# Patient Record
Sex: FEMALE | Race: WHITE | NOT HISPANIC OR LATINO | URBAN - METROPOLITAN AREA
[De-identification: names, ages, dates, MRNs, and addresses within clinical notes are randomized per-mention and may not be internally consistent; named-entity substitution may affect disease eponyms.]

---

## 2017-07-14 ENCOUNTER — EMERGENCY (EMERGENCY)
Facility: HOSPITAL | Age: 82
LOS: 1 days | End: 2017-07-14
Payer: MEDICARE

## 2017-07-14 PROCEDURE — 99284 EMERGENCY DEPT VISIT MOD MDM: CPT

## 2017-07-14 PROCEDURE — 70450 CT HEAD/BRAIN W/O DYE: CPT | Mod: 26

## 2018-05-14 ENCOUNTER — APPOINTMENT (OUTPATIENT)
Dept: OBGYN | Facility: CLINIC | Age: 83
End: 2018-05-14
Payer: MEDICARE

## 2018-05-14 VITALS
DIASTOLIC BLOOD PRESSURE: 80 MMHG | HEIGHT: 60 IN | BODY MASS INDEX: 29.84 KG/M2 | SYSTOLIC BLOOD PRESSURE: 130 MMHG | WEIGHT: 152 LBS

## 2018-05-14 PROCEDURE — 57150 TREAT VAGINA INFECTION: CPT

## 2018-08-16 ENCOUNTER — APPOINTMENT (OUTPATIENT)
Dept: OBGYN | Facility: CLINIC | Age: 83
End: 2018-08-16
Payer: MEDICARE

## 2018-08-16 VITALS — WEIGHT: 152 LBS | HEIGHT: 60 IN | BODY MASS INDEX: 29.84 KG/M2

## 2018-08-16 DIAGNOSIS — Z86.79 PERSONAL HISTORY OF OTHER DISEASES OF THE CIRCULATORY SYSTEM: ICD-10-CM

## 2018-08-16 DIAGNOSIS — Z86.39 PERSONAL HISTORY OF OTHER ENDOCRINE, NUTRITIONAL AND METABOLIC DISEASE: ICD-10-CM

## 2018-08-16 PROCEDURE — 57150 TREAT VAGINA INFECTION: CPT

## 2018-08-16 RX ORDER — ENALAPRIL MALEATE 20 MG/1
20 TABLET ORAL
Refills: 0 | Status: ACTIVE | COMMUNITY

## 2018-11-16 ENCOUNTER — APPOINTMENT (OUTPATIENT)
Dept: OBGYN | Facility: CLINIC | Age: 83
End: 2018-11-16
Payer: MEDICARE

## 2018-11-16 PROCEDURE — 57150 TREAT VAGINA INFECTION: CPT

## 2018-11-16 RX ORDER — HYDROCHLOROTHIAZIDE 25 MG/1
25 TABLET ORAL
Refills: 0 | Status: ACTIVE | COMMUNITY

## 2018-11-16 RX ORDER — OXYQUINOLINE/BORIC ACID 0.025 %
0.03 JELLY WITH APPLICATOR (GRAM) VAGINAL
Qty: 15 | Refills: 3 | Status: ACTIVE | COMMUNITY
Start: 2018-11-16 | End: 1900-01-01

## 2018-11-16 RX ORDER — LATANOPROST 50 UG/ML
0.01 SOLUTION OPHTHALMIC
Refills: 0 | Status: ACTIVE | COMMUNITY

## 2019-01-31 ENCOUNTER — EMERGENCY (EMERGENCY)
Facility: HOSPITAL | Age: 84
LOS: 1 days | End: 2019-01-31
Payer: MEDICARE

## 2019-01-31 PROCEDURE — 72170 X-RAY EXAM OF PELVIS: CPT | Mod: 26

## 2019-01-31 PROCEDURE — 72131 CT LUMBAR SPINE W/O DYE: CPT | Mod: 26

## 2019-01-31 PROCEDURE — 93970 EXTREMITY STUDY: CPT | Mod: 26

## 2019-01-31 PROCEDURE — 71045 X-RAY EXAM CHEST 1 VIEW: CPT | Mod: 26

## 2019-01-31 PROCEDURE — 72125 CT NECK SPINE W/O DYE: CPT | Mod: 26

## 2019-01-31 PROCEDURE — 70450 CT HEAD/BRAIN W/O DYE: CPT | Mod: 26

## 2019-01-31 PROCEDURE — 99285 EMERGENCY DEPT VISIT HI MDM: CPT

## 2019-02-11 ENCOUNTER — OUTPATIENT (OUTPATIENT)
Dept: OUTPATIENT SERVICES | Facility: HOSPITAL | Age: 84
LOS: 1 days | End: 2019-02-11
Payer: MEDICARE

## 2019-02-11 PROCEDURE — 72148 MRI LUMBAR SPINE W/O DYE: CPT | Mod: 26

## 2019-02-13 ENCOUNTER — APPOINTMENT (OUTPATIENT)
Dept: OBGYN | Facility: CLINIC | Age: 84
End: 2019-02-13
Payer: MEDICARE

## 2019-02-13 VITALS
WEIGHT: 152 LBS | DIASTOLIC BLOOD PRESSURE: 66 MMHG | HEIGHT: 60 IN | SYSTOLIC BLOOD PRESSURE: 150 MMHG | BODY MASS INDEX: 29.84 KG/M2

## 2019-02-13 PROCEDURE — 57150 TREAT VAGINA INFECTION: CPT

## 2019-02-13 NOTE — DISCUSSION/SUMMARY
[FreeTextEntry1] : a89-year-old white female came to the office for pessary cleaning patient denies having q.a.m. problems essentially were included and vaginal cavity cleaned and irrigated with Betadine return to the office

## 2019-02-14 ENCOUNTER — APPOINTMENT (OUTPATIENT)
Dept: OBGYN | Facility: HOSPITAL | Age: 84
End: 2019-02-14

## 2019-05-14 ENCOUNTER — APPOINTMENT (OUTPATIENT)
Dept: OBGYN | Facility: CLINIC | Age: 84
End: 2019-05-14

## 2019-05-16 ENCOUNTER — APPOINTMENT (OUTPATIENT)
Dept: OBGYN | Facility: CLINIC | Age: 84
End: 2019-05-16
Payer: MEDICARE

## 2019-05-16 PROCEDURE — 57150 TREAT VAGINA INFECTION: CPT

## 2019-05-16 NOTE — DISCUSSION/SUMMARY
[FreeTextEntry1] : a89 years old white female came to the office for pessary cleaning patient has cystorectocele and is being helped by the pessary but said he washed and cleaned vaginal area irrigated with Betadine reinserted the pessary return to the office in 3 months

## 2019-06-05 ENCOUNTER — EMERGENCY (EMERGENCY)
Facility: HOSPITAL | Age: 84
LOS: 1 days | End: 2019-06-05
Admitting: EMERGENCY MEDICINE
Payer: MEDICARE

## 2019-06-05 PROCEDURE — 70450 CT HEAD/BRAIN W/O DYE: CPT | Mod: 26

## 2019-06-05 PROCEDURE — 72125 CT NECK SPINE W/O DYE: CPT | Mod: 26

## 2019-06-05 PROCEDURE — 71046 X-RAY EXAM CHEST 2 VIEWS: CPT | Mod: 26

## 2019-06-05 PROCEDURE — 73030 X-RAY EXAM OF SHOULDER: CPT | Mod: 26,RT

## 2019-06-05 PROCEDURE — 99285 EMERGENCY DEPT VISIT HI MDM: CPT

## 2019-06-06 ENCOUNTER — INPATIENT (INPATIENT)
Facility: HOSPITAL | Age: 84
LOS: 1 days | Discharge: HOME | End: 2019-06-08
Payer: MEDICARE

## 2019-06-06 ENCOUNTER — OUTPATIENT (OUTPATIENT)
Dept: OUTPATIENT SERVICES | Facility: HOSPITAL | Age: 84
LOS: 1 days | End: 2019-06-06

## 2019-06-06 PROCEDURE — 99285 EMERGENCY DEPT VISIT HI MDM: CPT

## 2019-06-06 PROCEDURE — 93010 ELECTROCARDIOGRAM REPORT: CPT | Mod: 76

## 2019-06-06 PROCEDURE — 76770 US EXAM ABDO BACK WALL COMP: CPT | Mod: 26

## 2019-06-06 PROCEDURE — 93010 ELECTROCARDIOGRAM REPORT: CPT

## 2019-06-06 PROCEDURE — 76856 US EXAM PELVIC COMPLETE: CPT | Mod: 26

## 2019-06-07 ENCOUNTER — OUTPATIENT (OUTPATIENT)
Dept: OUTPATIENT SERVICES | Facility: HOSPITAL | Age: 84
LOS: 1 days | End: 2019-06-07

## 2019-06-08 ENCOUNTER — OUTPATIENT (OUTPATIENT)
Dept: OUTPATIENT SERVICES | Facility: HOSPITAL | Age: 84
LOS: 1 days | End: 2019-06-08

## 2019-06-12 ENCOUNTER — OUTPATIENT (OUTPATIENT)
Dept: OUTPATIENT SERVICES | Facility: HOSPITAL | Age: 84
LOS: 1 days | End: 2019-06-12

## 2019-06-12 ENCOUNTER — APPOINTMENT (OUTPATIENT)
Dept: OBGYN | Facility: CLINIC | Age: 84
End: 2019-06-12
Payer: MEDICARE

## 2019-06-12 PROCEDURE — 57150 TREAT VAGINA INFECTION: CPT

## 2019-06-12 NOTE — DISCUSSION/SUMMARY
[FreeTextEntry1] : 89 years old white female came to the office for pessary cleaning  she was admited to the hospital for uti  will culture  after treatment is done pessary cleaned and reinserted

## 2019-06-18 ENCOUNTER — OUTPATIENT (OUTPATIENT)
Dept: OUTPATIENT SERVICES | Facility: HOSPITAL | Age: 84
LOS: 1 days | End: 2019-06-18

## 2019-06-27 ENCOUNTER — OUTPATIENT (OUTPATIENT)
Dept: OUTPATIENT SERVICES | Facility: HOSPITAL | Age: 84
LOS: 1 days | End: 2019-06-27

## 2019-07-06 ENCOUNTER — EMERGENCY (EMERGENCY)
Facility: HOSPITAL | Age: 84
LOS: 1 days | End: 2019-07-06
Admitting: EMERGENCY MEDICINE
Payer: MEDICARE

## 2019-07-06 PROCEDURE — 71045 X-RAY EXAM CHEST 1 VIEW: CPT | Mod: 26

## 2019-07-06 PROCEDURE — 99284 EMERGENCY DEPT VISIT MOD MDM: CPT

## 2019-07-10 ENCOUNTER — OUTPATIENT (OUTPATIENT)
Dept: OUTPATIENT SERVICES | Facility: HOSPITAL | Age: 84
LOS: 1 days | End: 2019-07-10

## 2019-07-10 ENCOUNTER — INPATIENT (INPATIENT)
Facility: HOSPITAL | Age: 84
LOS: 8 days | Discharge: HOME | End: 2019-07-19
Payer: MEDICARE

## 2019-07-10 PROCEDURE — 99285 EMERGENCY DEPT VISIT HI MDM: CPT

## 2019-07-10 PROCEDURE — 71045 X-RAY EXAM CHEST 1 VIEW: CPT | Mod: 26

## 2019-07-11 ENCOUNTER — OUTPATIENT (OUTPATIENT)
Dept: OUTPATIENT SERVICES | Facility: HOSPITAL | Age: 84
LOS: 1 days | End: 2019-07-11

## 2019-07-11 PROCEDURE — 76770 US EXAM ABDO BACK WALL COMP: CPT | Mod: 26

## 2019-07-11 PROCEDURE — 93010 ELECTROCARDIOGRAM REPORT: CPT

## 2019-07-11 PROCEDURE — 76856 US EXAM PELVIC COMPLETE: CPT | Mod: 26

## 2019-07-12 ENCOUNTER — OUTPATIENT (OUTPATIENT)
Dept: OUTPATIENT SERVICES | Facility: HOSPITAL | Age: 84
LOS: 1 days | End: 2019-07-12

## 2019-07-12 ENCOUNTER — OTHER (OUTPATIENT)
Age: 84
End: 2019-07-12

## 2019-07-13 ENCOUNTER — OUTPATIENT (OUTPATIENT)
Dept: OUTPATIENT SERVICES | Facility: HOSPITAL | Age: 84
LOS: 1 days | End: 2019-07-13

## 2019-07-14 ENCOUNTER — OUTPATIENT (OUTPATIENT)
Dept: OUTPATIENT SERVICES | Facility: HOSPITAL | Age: 84
LOS: 1 days | End: 2019-07-14

## 2019-07-15 ENCOUNTER — OUTPATIENT (OUTPATIENT)
Dept: OUTPATIENT SERVICES | Facility: HOSPITAL | Age: 84
LOS: 1 days | End: 2019-07-15

## 2019-07-15 PROCEDURE — 93971 EXTREMITY STUDY: CPT | Mod: 26,RT

## 2019-07-16 ENCOUNTER — OUTPATIENT (OUTPATIENT)
Dept: OUTPATIENT SERVICES | Facility: HOSPITAL | Age: 84
LOS: 1 days | End: 2019-07-16

## 2019-07-17 ENCOUNTER — OUTPATIENT (OUTPATIENT)
Dept: OUTPATIENT SERVICES | Facility: HOSPITAL | Age: 84
LOS: 1 days | End: 2019-07-17

## 2019-07-18 ENCOUNTER — OUTPATIENT (OUTPATIENT)
Dept: OUTPATIENT SERVICES | Facility: HOSPITAL | Age: 84
LOS: 1 days | End: 2019-07-18

## 2019-07-19 ENCOUNTER — OUTPATIENT (OUTPATIENT)
Dept: OUTPATIENT SERVICES | Facility: HOSPITAL | Age: 84
LOS: 1 days | End: 2019-07-19

## 2019-08-01 ENCOUNTER — OUTPATIENT (OUTPATIENT)
Dept: OUTPATIENT SERVICES | Facility: HOSPITAL | Age: 84
LOS: 1 days | End: 2019-08-01

## 2019-08-05 ENCOUNTER — OUTPATIENT (OUTPATIENT)
Dept: OUTPATIENT SERVICES | Facility: HOSPITAL | Age: 84
LOS: 1 days | End: 2019-08-05

## 2019-08-09 ENCOUNTER — INPATIENT (INPATIENT)
Facility: HOSPITAL | Age: 84
LOS: 2 days | Discharge: HOME | End: 2019-08-12
Payer: MEDICARE

## 2019-08-09 ENCOUNTER — OUTPATIENT (OUTPATIENT)
Dept: OUTPATIENT SERVICES | Facility: HOSPITAL | Age: 84
LOS: 1 days | End: 2019-08-09

## 2019-08-09 PROCEDURE — 99285 EMERGENCY DEPT VISIT HI MDM: CPT

## 2019-08-10 ENCOUNTER — OUTPATIENT (OUTPATIENT)
Dept: OUTPATIENT SERVICES | Facility: HOSPITAL | Age: 84
LOS: 1 days | End: 2019-08-10

## 2019-08-11 ENCOUNTER — OUTPATIENT (OUTPATIENT)
Dept: OUTPATIENT SERVICES | Facility: HOSPITAL | Age: 84
LOS: 1 days | End: 2019-08-11

## 2019-08-15 ENCOUNTER — OUTPATIENT (OUTPATIENT)
Dept: OUTPATIENT SERVICES | Facility: HOSPITAL | Age: 84
LOS: 1 days | End: 2019-08-15

## 2019-08-21 ENCOUNTER — APPOINTMENT (OUTPATIENT)
Dept: OBGYN | Facility: CLINIC | Age: 84
End: 2019-08-21

## 2019-08-21 ENCOUNTER — EMERGENCY (EMERGENCY)
Facility: HOSPITAL | Age: 84
LOS: 1 days | End: 2019-08-21
Admitting: EMERGENCY MEDICINE
Payer: MEDICARE

## 2019-08-21 PROCEDURE — 99284 EMERGENCY DEPT VISIT MOD MDM: CPT

## 2019-08-22 ENCOUNTER — EMERGENCY (EMERGENCY)
Facility: HOSPITAL | Age: 84
LOS: 1 days | End: 2019-08-22
Admitting: EMERGENCY MEDICINE
Payer: MEDICARE

## 2019-08-22 PROCEDURE — 99283 EMERGENCY DEPT VISIT LOW MDM: CPT

## 2019-08-27 ENCOUNTER — APPOINTMENT (OUTPATIENT)
Dept: OBGYN | Facility: CLINIC | Age: 84
End: 2019-08-27
Payer: MEDICARE

## 2019-08-27 VITALS
HEIGHT: 60 IN | BODY MASS INDEX: 29.84 KG/M2 | SYSTOLIC BLOOD PRESSURE: 140 MMHG | DIASTOLIC BLOOD PRESSURE: 70 MMHG | WEIGHT: 152 LBS

## 2019-08-27 PROCEDURE — 57150 TREAT VAGINA INFECTION: CPT

## 2019-08-27 NOTE — DISCUSSION/SUMMARY
[FreeTextEntry1] : a89 years old white female continue office for pessary cleaning pessary cleaned irrigated the vaginal area with Betadine transected the pessary return to the office in 3 months

## 2019-09-05 ENCOUNTER — OUTPATIENT (OUTPATIENT)
Dept: OUTPATIENT SERVICES | Facility: HOSPITAL | Age: 84
LOS: 1 days | End: 2019-09-05

## 2019-09-05 ENCOUNTER — INPATIENT (INPATIENT)
Facility: HOSPITAL | Age: 84
LOS: 0 days | Discharge: HOME | End: 2019-09-06
Payer: MEDICARE

## 2019-09-05 PROCEDURE — 99285 EMERGENCY DEPT VISIT HI MDM: CPT

## 2019-09-06 ENCOUNTER — OUTPATIENT (OUTPATIENT)
Dept: OUTPATIENT SERVICES | Facility: HOSPITAL | Age: 84
LOS: 1 days | End: 2019-09-06

## 2019-09-06 PROCEDURE — 76770 US EXAM ABDO BACK WALL COMP: CPT | Mod: 26

## 2019-09-06 PROCEDURE — 93010 ELECTROCARDIOGRAM REPORT: CPT

## 2019-09-06 PROCEDURE — 76856 US EXAM PELVIC COMPLETE: CPT | Mod: 26

## 2019-09-06 PROCEDURE — 93880 EXTRACRANIAL BILAT STUDY: CPT | Mod: 26

## 2019-09-18 ENCOUNTER — APPOINTMENT (OUTPATIENT)
Age: 84
End: 2019-09-18

## 2019-09-27 ENCOUNTER — EMERGENCY (EMERGENCY)
Facility: HOSPITAL | Age: 84
LOS: 1 days | End: 2019-09-27
Admitting: EMERGENCY MEDICINE
Payer: MEDICARE

## 2019-09-27 PROCEDURE — 99284 EMERGENCY DEPT VISIT MOD MDM: CPT

## 2019-10-02 ENCOUNTER — APPOINTMENT (OUTPATIENT)
Dept: ULTRASOUND IMAGING | Facility: CLINIC | Age: 84
End: 2019-10-02
Payer: MEDICARE

## 2019-10-02 PROCEDURE — 93971 EXTREMITY STUDY: CPT | Mod: RT

## 2019-11-06 ENCOUNTER — APPOINTMENT (OUTPATIENT)
Dept: OBGYN | Facility: CLINIC | Age: 84
End: 2019-11-06

## 2019-11-11 ENCOUNTER — APPOINTMENT (OUTPATIENT)
Dept: OBGYN | Facility: CLINIC | Age: 84
End: 2019-11-11
Payer: MEDICARE

## 2019-11-11 VITALS
DIASTOLIC BLOOD PRESSURE: 62 MMHG | BODY MASS INDEX: 26.31 KG/M2 | WEIGHT: 134 LBS | SYSTOLIC BLOOD PRESSURE: 100 MMHG | HEIGHT: 60 IN

## 2019-11-11 PROCEDURE — 57150 TREAT VAGINA INFECTION: CPT

## 2019-11-20 PROBLEM — Z00.00 ENCOUNTER FOR PREVENTIVE HEALTH EXAMINATION: Status: ACTIVE | Noted: 2019-11-20

## 2020-02-14 ENCOUNTER — APPOINTMENT (OUTPATIENT)
Dept: OBGYN | Facility: CLINIC | Age: 85
End: 2020-02-14
Payer: MEDICARE

## 2020-02-14 VITALS
BODY MASS INDEX: 25.52 KG/M2 | SYSTOLIC BLOOD PRESSURE: 124 MMHG | HEIGHT: 60 IN | WEIGHT: 130 LBS | DIASTOLIC BLOOD PRESSURE: 70 MMHG

## 2020-02-14 DIAGNOSIS — Z00.00 ENCOUNTER FOR GENERAL ADULT MEDICAL EXAMINATION W/OUT ABNORMAL FINDINGS: ICD-10-CM

## 2020-02-14 PROCEDURE — 99213 OFFICE O/P EST LOW 20 MIN: CPT

## 2020-03-05 ENCOUNTER — OUTPATIENT (OUTPATIENT)
Dept: OUTPATIENT SERVICES | Facility: HOSPITAL | Age: 85
LOS: 1 days | End: 2020-03-05

## 2020-03-07 ENCOUNTER — EMERGENCY (EMERGENCY)
Facility: HOSPITAL | Age: 85
LOS: 1 days | End: 2020-03-07
Admitting: EMERGENCY MEDICINE
Payer: MEDICARE

## 2020-03-07 PROCEDURE — 99283 EMERGENCY DEPT VISIT LOW MDM: CPT

## 2020-03-07 PROCEDURE — 72110 X-RAY EXAM L-2 SPINE 4/>VWS: CPT | Mod: 26

## 2020-03-07 PROCEDURE — 73080 X-RAY EXAM OF ELBOW: CPT | Mod: 26,RT

## 2020-03-07 PROCEDURE — 72220 X-RAY EXAM SACRUM TAILBONE: CPT | Mod: 26

## 2020-03-09 ENCOUNTER — OUTPATIENT (OUTPATIENT)
Dept: OUTPATIENT SERVICES | Facility: HOSPITAL | Age: 85
LOS: 1 days | End: 2020-03-09
Payer: MEDICARE

## 2020-03-09 PROCEDURE — 74270 X-RAY XM COLON 1CNTRST STD: CPT | Mod: 26

## 2020-03-17 ENCOUNTER — OUTPATIENT (OUTPATIENT)
Dept: OUTPATIENT SERVICES | Facility: HOSPITAL | Age: 85
LOS: 1 days | End: 2020-03-17

## 2020-04-09 ENCOUNTER — OUTPATIENT (OUTPATIENT)
Dept: OUTPATIENT SERVICES | Facility: HOSPITAL | Age: 85
LOS: 1 days | End: 2020-04-09

## 2020-05-15 ENCOUNTER — APPOINTMENT (OUTPATIENT)
Dept: OBGYN | Facility: CLINIC | Age: 85
End: 2020-05-15
Payer: MEDICARE

## 2020-05-15 VITALS
WEIGHT: 130 LBS | BODY MASS INDEX: 25.52 KG/M2 | DIASTOLIC BLOOD PRESSURE: 66 MMHG | TEMPERATURE: 98.3 F | HEIGHT: 60 IN | SYSTOLIC BLOOD PRESSURE: 136 MMHG

## 2020-05-15 PROCEDURE — 99213 OFFICE O/P EST LOW 20 MIN: CPT

## 2020-06-10 ENCOUNTER — APPOINTMENT (OUTPATIENT)
Dept: RADIOLOGY | Facility: CLINIC | Age: 85
End: 2020-06-10
Payer: MEDICARE

## 2020-06-10 ENCOUNTER — APPOINTMENT (OUTPATIENT)
Dept: ULTRASOUND IMAGING | Facility: CLINIC | Age: 85
End: 2020-06-10

## 2020-06-10 PROCEDURE — 93971 EXTREMITY STUDY: CPT | Mod: LT

## 2020-06-10 PROCEDURE — 73630 X-RAY EXAM OF FOOT: CPT | Mod: LT

## 2020-07-20 ENCOUNTER — APPOINTMENT (OUTPATIENT)
Dept: OBGYN | Facility: CLINIC | Age: 85
End: 2020-07-20
Payer: MEDICARE

## 2020-07-20 VITALS
SYSTOLIC BLOOD PRESSURE: 140 MMHG | HEIGHT: 60 IN | WEIGHT: 121 LBS | DIASTOLIC BLOOD PRESSURE: 82 MMHG | BODY MASS INDEX: 23.75 KG/M2

## 2020-07-20 PROCEDURE — 99214 OFFICE O/P EST MOD 30 MIN: CPT

## 2020-07-31 ENCOUNTER — APPOINTMENT (OUTPATIENT)
Age: 85
End: 2020-07-31
Payer: MEDICARE

## 2020-07-31 ENCOUNTER — APPOINTMENT (OUTPATIENT)
Dept: UROGYNECOLOGY | Facility: CLINIC | Age: 85
End: 2020-07-31

## 2020-07-31 VITALS — SYSTOLIC BLOOD PRESSURE: 162 MMHG | DIASTOLIC BLOOD PRESSURE: 70 MMHG

## 2020-07-31 DIAGNOSIS — Z87.828 PERSONAL HISTORY OF OTHER (HEALED) PHYSICAL INJURY AND TRAUMA: ICD-10-CM

## 2020-07-31 DIAGNOSIS — N81.9 FEMALE GENITAL PROLAPSE, UNSPECIFIED: ICD-10-CM

## 2020-07-31 DIAGNOSIS — Z80.1 FAMILY HISTORY OF MALIGNANT NEOPLASM OF TRACHEA, BRONCHUS AND LUNG: ICD-10-CM

## 2020-07-31 DIAGNOSIS — Z86.79 PERSONAL HISTORY OF OTHER DISEASES OF THE CIRCULATORY SYSTEM: ICD-10-CM

## 2020-07-31 DIAGNOSIS — Z86.69 PERSONAL HISTORY OF OTHER DISEASES OF THE NERVOUS SYSTEM AND SENSE ORGANS: ICD-10-CM

## 2020-07-31 DIAGNOSIS — Z87.440 PERSONAL HISTORY OF URINARY (TRACT) INFECTIONS: ICD-10-CM

## 2020-07-31 DIAGNOSIS — Z82.49 FAMILY HISTORY OF ISCHEMIC HEART DISEASE AND OTHER DISEASES OF THE CIRCULATORY SYSTEM: ICD-10-CM

## 2020-07-31 DIAGNOSIS — R32 UNSPECIFIED URINARY INCONTINENCE: ICD-10-CM

## 2020-07-31 DIAGNOSIS — Z87.448 PERSONAL HISTORY OF OTHER DISEASES OF URINARY SYSTEM: ICD-10-CM

## 2020-07-31 DIAGNOSIS — Z63.4 DISAPPEARANCE AND DEATH OF FAMILY MEMBER: ICD-10-CM

## 2020-07-31 DIAGNOSIS — K59.00 CONSTIPATION, UNSPECIFIED: ICD-10-CM

## 2020-07-31 PROCEDURE — 51701 INSERT BLADDER CATHETER: CPT

## 2020-07-31 PROCEDURE — 99205 OFFICE O/P NEW HI 60 MIN: CPT | Mod: 25

## 2020-07-31 PROCEDURE — 99215 OFFICE O/P EST HI 40 MIN: CPT | Mod: 25

## 2020-07-31 SDOH — SOCIAL STABILITY - SOCIAL INSECURITY: DISSAPEARANCE AND DEATH OF FAMILY MEMBER: Z63.4

## 2020-07-31 NOTE — REASON FOR VISIT
[Initial Visit ___] : an initial visit for [unfilled] [Questionnaire Received] : Patient questionnaire received [Intake Form Reviewed] : Patient intake form with past medical history, surgical history, family history and social history reviewed today

## 2020-07-31 NOTE — LETTER BODY
[Dear  ___] : Dear  [unfilled], [Dear  ___] : Dear ~ASHLEY, [Attached please find my note.] : Attached please find my note. [DrAzael  ___] : Dr. LOERA  [Thank you very much for allowing me to participate in the care of this patient. If you have any questions, please do not hesitate to contact me] : Thank you very much for allowing me to participate in the care of this patient. If you have any questions, please do not hesitate to contact me.

## 2020-08-03 ENCOUNTER — RESULT CHARGE (OUTPATIENT)
Age: 85
End: 2020-08-03

## 2020-08-03 ENCOUNTER — APPOINTMENT (OUTPATIENT)
Age: 85
End: 2020-08-03
Payer: MEDICARE

## 2020-08-03 LAB
APPEARANCE: CLEAR
BACTERIA: ABNORMAL
BILIRUB UR QL STRIP: NEGATIVE
BILIRUBIN URINE: NEGATIVE
BLOOD URINE: NEGATIVE
CLARITY UR: CLEAR
COLLECTION METHOD: NORMAL
COLOR: NORMAL
GLUCOSE QUALITATIVE U: NEGATIVE
GLUCOSE UR-MCNC: NEGATIVE
HCG UR QL: 0.2 EU/DL
HGB UR QL STRIP.AUTO: NORMAL
HYALINE CASTS: 2 /LPF
KETONES UR-MCNC: NEGATIVE
KETONES URINE: NEGATIVE
LEUKOCYTE ESTERASE UR QL STRIP: NORMAL
LEUKOCYTE ESTERASE URINE: ABNORMAL
MICROSCOPIC-UA: NORMAL
NITRITE UR QL STRIP: NEGATIVE
NITRITE URINE: POSITIVE
PH UR STRIP: 5
PH URINE: 5.5
PROT UR STRIP-MCNC: NEGATIVE
PROTEIN URINE: NEGATIVE
RED BLOOD CELLS URINE: 0 /HPF
SP GR UR STRIP: 1.02
SPECIFIC GRAVITY URINE: 1.01
SQUAMOUS EPITHELIAL CELLS: 0 /HPF
UROBILINOGEN URINE: NORMAL
WHITE BLOOD CELLS URINE: 27 /HPF

## 2020-08-03 PROCEDURE — 51701 INSERT BLADDER CATHETER: CPT

## 2020-08-03 PROCEDURE — 99213 OFFICE O/P EST LOW 20 MIN: CPT | Mod: 25

## 2020-08-03 NOTE — HISTORY OF PRESENT ILLNESS
[Cystocele (Obstetric)] : no [Vaginal Wall Prolapse] : no [Rectal Prolapse] : no [Unable To Restrain Bowel Movement] : moderate [x1] : nocturia once nightly [Feelings Of Urinary Urgency] : no [Urinary Frequency] : no [Pain During Urination (Dysuria)] : no [Urinary Tract Infection] : moderate [Incomplete Emptying Of Stool] : no [Constipation Obstructed Defecation] : mild [] : mild [Pelvic Pain] : no [FreeTextEntry5] : none with pessary  [Vaginal Pain] : no [FreeTextEntry1] : \susana Triny presents for repeat cath as initial cath contaminated\par feels better with pessary out, reports mild rectal symptoms, only time uncomfortable is with sititng \par otherwise no change in symptoms, able to void with pessary

## 2020-08-03 NOTE — PHYSICAL EXAM
[Chaperone Present] : A chaperone was present in the examining room during all aspects of the physical examination [No Acute Distress] : in no acute distress [Well developed] : well developed [Well Nourished] : ~L well nourished [Oriented x3] : oriented to person, place, and time [Normal Memory] : ~T memory was ~L unimpaired [No Edema] : ~T edema was not present [None] : no CVA tenderness [Warm and Dry] : was warm and dry to touch [Normal Gait] : gait was normal [Vulvar Atrophy] : vulvar atrophy [Labia Majora] : were normal [Labia Minora] : were normal [Atrophy] : atrophy [No Bleeding] : there was no active vaginal bleeding [Ap ____] : Ap [unfilled] [Bp ____] : Bp [unfilled] [Soft] :  the cervix was soft [Uterine Adnexae] : were not tender and not enlarged [Normal] : no abnormalities [Post Void Residual ____ml] : post void residual was [unfilled] ml [Hernia] : hernia observed [Inguinal Hernia Right] : a right inguinal hernia was present [] : which was reducible [FreeTextEntry1] : General: Well, appearing. Alert and orientated. No acute distress\par HEENT: Normocephalic, atraumatic and extraocular muscles appear to be intact \par Neck: Full range of motion, no obvious lymphadenopathy, deformities, or masses noted \par Respiratory: Speaking in full sentences comfortably, normal work of breathing and no cough during visit\par Musculoskeletal: active full range of motion in extremities \par Extremities: No upper extremity edema noted\par Skin: no obvious rash or skin lesions\par Neuro: Orientated X 3, speech is fluent, normal rate\par Psych: Normal mood and affect \par \par  [Cough] : no cough [Distended] : not distended [Inguinal LAD] : no adenopathy was noted in the inguinal lymph nodes [H/Smegaly] : no hepatosplenomegaly [FreeTextEntry3] : caruncle  [de-identified] : unable to assess due to pessary [de-identified] : rectal mucosa prolapse noted

## 2020-08-03 NOTE — PROCEDURE
[Good Fit] : fits well [Pessary Out] : removed [None] : no bleeding [Medication Review] : Medicaiton Review: Patient verbalizes understanding of risks and benefits [Fluid Management] : Fluid Management: patient verbalizes understanding 6-10 cups per day [Bowel Management] : Bowel Management: patient verbalizes understanding of daily dietary fiber intake [Bladder Training] : Bladder Training: Patient given information with verbal understanding [Refit] : refit is not needed [Erosion] : no evidence of erosion [Erythema] : no erythema [Discharge] : no vaginal discharge [Infection] : no evidence of infection [FreeTextEntry1] : sterile straight catheterization performed to assess post void residual due to incontinence

## 2020-08-03 NOTE — HISTORY OF PRESENT ILLNESS
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

## 2020-08-03 NOTE — PHYSICAL EXAM
[Chaperone Present] : A chaperone was present in the examining room during all aspects of the physical examination [FreeTextEntry1] : General: Well, appearing. Alert and orientated. No acute distress\par HEENT: Normocephalic, atraumatic and extraocular muscles appear to be intact \par Neck: Full range of motion, no obvious lymphadenopathy, deformities, or masses noted \par Respiratory: Speaking in full sentences comfortably, normal work of breathing and no cough during visit\par Musculoskeletal: active full range of motion in extremities \par Extremities: No upper extremity edema noted\par Skin: no obvious rash or skin lesions\par Neuro: Orientated X 3, speech is fluent, normal rate\par Psych: Normal mood and affect \par \par  [No Acute Distress] : in no acute distress [Well developed] : well developed [Well Nourished] : ~L well nourished [Normal Memory] : ~T memory was ~L unimpaired [Oriented x3] : oriented to person, place, and time [Cough] : no cough [No Edema] : ~T edema was not present [Distended] : not distended [Hernia] : hernia observed [None] : no CVA tenderness [H/Smegaly] : no hepatosplenomegaly [Inguinal LAD] : no adenopathy was noted in the inguinal lymph nodes [] : which was reducible [Inguinal Hernia Right] : a right inguinal hernia was present [Warm and Dry] : was warm and dry to touch [Normal Gait] : gait was normal [Vulvar Atrophy] : vulvar atrophy [Labia Majora] : were normal [Atrophy] : atrophy [Labia Minora] : were normal [No Bleeding] : there was no active vaginal bleeding [Bp ____] : Bp [unfilled] [Ap ____] : Ap [unfilled] [Soft] :  the cervix was soft [Normal] : no abnormalities [Uterine Adnexae] : were not tender and not enlarged [Post Void Residual ____ml] : post void residual was [unfilled] ml [de-identified] : no apical descent [FreeTextEntry3] : caruncle

## 2020-08-03 NOTE — DISCUSSION/SUMMARY
[FreeTextEntry1] : \par Triny presents for f/u for cath u/a., will resend urine prior to treatment, get pelvic sonogram, will return to reassess prolapse, f/u with Dr. Silva, discussed if mild symptoms would advise not to have surgical intervention at this time. all questions were answered.\par

## 2020-08-03 NOTE — PROCEDURE
[Urinary Tract Infection] : a urinary tract infection [Patient] : the patient [Straight Catheterization] : insertion of a straight catheter [___ Fr Straight Tip] : a [unfilled] in [place] straight tip catheter [Clear] : clear [Tolerated Well] : the patient tolerated the procedure well [No Complications] : no complications [Culture] : culture [Post procedure instructions and information given] : Post procedure instructions and information were given and reviewed with patient.

## 2020-08-03 NOTE — DISCUSSION/SUMMARY
[FreeTextEntry1] : Rtiny presents with rectal and vaginal prolapse. on exam normal PVR, rectocele, unable to assess apical descent, rectal prolapse noted, right inguinal hernia. Urethral caruncle, discussed benign, can do vaginal estrogen but hesitant with history of DVT, advised to monitor. Visual illustrations were used to demonstrate prolapse. We reviewed management options for her prolapse including: observation, pelvic floor exercises with and without physical therapy, pessary, and surgical management. We reviewed the different types of surgical procedures including abdominal, vaginal, and robotic/laparoscopic procedures. In addition we reviewed procedures that involve hysterectomy as well as surgeries with uterine preservation. Mesh and non-mesh options were reviewed. IUGA information on prolapse and PFE was given to her. Will reeval prolapse in 2 wks with pessary out and determine plan, to get sonogram in meantime and CRS eval. All questions were answered with her daughter present.\par \par [] pessary out X 2 wks and reeval\par [] u/a culture\par [] pelvic sonogram\par [] observation of urethral caruncle \par [] possible surgical management of prolapse at time of rectal prolapse management, right inguinal hernia

## 2020-08-04 LAB
APPEARANCE: CLEAR
BACTERIA: NEGATIVE
BILIRUBIN URINE: NEGATIVE
BLOOD URINE: NEGATIVE
COLOR: NORMAL
GLUCOSE QUALITATIVE U: NEGATIVE
HYALINE CASTS: 0 /LPF
KETONES URINE: NEGATIVE
LEUKOCYTE ESTERASE URINE: NEGATIVE
MICROSCOPIC-UA: NORMAL
NITRITE URINE: NEGATIVE
PH URINE: 6
PROTEIN URINE: NEGATIVE
RED BLOOD CELLS URINE: 1 /HPF
SPECIFIC GRAVITY URINE: 1.01
SQUAMOUS EPITHELIAL CELLS: 1 /HPF
UROBILINOGEN URINE: NORMAL
WHITE BLOOD CELLS URINE: 1 /HPF

## 2020-08-05 ENCOUNTER — APPOINTMENT (OUTPATIENT)
Dept: ULTRASOUND IMAGING | Facility: CLINIC | Age: 85
End: 2020-08-05
Payer: MEDICARE

## 2020-08-05 PROCEDURE — 76856 US EXAM PELVIC COMPLETE: CPT

## 2020-08-10 ENCOUNTER — OUTPATIENT (OUTPATIENT)
Dept: OUTPATIENT SERVICES | Facility: HOSPITAL | Age: 85
LOS: 1 days | End: 2020-08-10

## 2020-08-14 ENCOUNTER — RESULT CHARGE (OUTPATIENT)
Age: 85
End: 2020-08-14

## 2020-08-14 ENCOUNTER — APPOINTMENT (OUTPATIENT)
Age: 85
End: 2020-08-14
Payer: MEDICARE

## 2020-08-14 DIAGNOSIS — R39.15 URGENCY OF URINATION: ICD-10-CM

## 2020-08-14 DIAGNOSIS — N81.6 RECTOCELE: ICD-10-CM

## 2020-08-14 DIAGNOSIS — N39.41 URGE INCONTINENCE: ICD-10-CM

## 2020-08-14 LAB
BILIRUB UR QL STRIP: NEGATIVE
CLARITY UR: CLEAR
COLLECTION METHOD: NORMAL
GLUCOSE UR-MCNC: NEGATIVE
HCG UR QL: 0.2 EU/DL
HGB UR QL STRIP.AUTO: NEGATIVE
KETONES UR-MCNC: NEGATIVE
LEUKOCYTE ESTERASE UR QL STRIP: NEGATIVE
NITRITE UR QL STRIP: NEGATIVE
PH UR STRIP: 5
PROT UR STRIP-MCNC: NEGATIVE
SP GR UR STRIP: 1.02

## 2020-08-14 PROCEDURE — 81003 URINALYSIS AUTO W/O SCOPE: CPT | Mod: QW

## 2020-08-14 PROCEDURE — 99214 OFFICE O/P EST MOD 30 MIN: CPT

## 2020-08-14 NOTE — PROCEDURE
[Straight Catheterization] : insertion of a straight catheter [Urinary Tract Infection] : a urinary tract infection [Intraurethral 2% Lidocaine Gel ___ (cc)] : Local Anesthesia: [unfilled] cc of 2% Lidocaine Gel was administered intraurethrally  [Patient] : the patient [___ Fr Straight Tip] : a [unfilled] in [place] straight tip catheter [Clear] : clear [No Complications] : no complications [Culture] : culture [Post procedure instructions and information given] : Post procedure instructions and information were given and reviewed with patient. [0] : 0 [Tolerated Well] : the patient tolerated the procedure well

## 2020-08-14 NOTE — DISCUSSION/SUMMARY
[FreeTextEntry1] : \susana Agosto presents for f/u for re-exam of prolapse, no apical descent today, vaginal bulge not bothersome, right inguinal hernia repair.  Regarding prolapse, at this time there is no apical prolapse and i do not recommend surgery, she can proceed as desired with rectopexy by Dr. Silva and if any vaginal prolapse bothersome will address with pessary, however unlikely given it is not bothering her X 2 wks with pessary out. return as needed. all questions were answered.

## 2020-08-14 NOTE — PHYSICAL EXAM
[Chaperone Present] : A chaperone was present in the examining room during all aspects of the physical examination [No Acute Distress] : in no acute distress [Well developed] : well developed [Well Nourished] : ~L well nourished [Normal Memory] : ~T memory was ~L unimpaired [Oriented x3] : oriented to person, place, and time [No Edema] : ~T edema was not present [Hernia] : hernia observed [] : which was reducible [None] : no CVA tenderness [Inguinal Hernia Right] : a right inguinal hernia was present [Normal Gait] : gait was normal [Warm and Dry] : was warm and dry to touch [Vulvar Atrophy] : vulvar atrophy [Labia Majora] : were normal [Labia Minora] : were normal [Atrophy] : atrophy [No Bleeding] : there was no active vaginal bleeding [Ap ____] : Ap [unfilled] [Bp ____] : Bp [unfilled] [Soft] :  the cervix was soft [Uterine Adnexae] : were not tender and not enlarged [Normal] : no abnormalities [Post Void Residual ____ml] : post void residual was [unfilled] ml [FreeTextEntry1] : General: Well, appearing. Alert and orientated. No acute distress\par HEENT: Normocephalic, atraumatic and extraocular muscles appear to be intact \par Neck: Full range of motion, no obvious lymphadenopathy, deformities, or masses noted \par Respiratory: Speaking in full sentences comfortably, normal work of breathing and no cough during visit\par Musculoskeletal: active full range of motion in extremities \par Extremities: No upper extremity edema noted\par Skin: no obvious rash or skin lesions\par Neuro: Orientated X 3, speech is fluent, normal rate\par Psych: Normal mood and affect \par \par  [Cough] : no cough [Distended] : not distended [H/Smegaly] : no hepatosplenomegaly [Inguinal LAD] : no adenopathy was noted in the inguinal lymph nodes [Aa ____] : Aa [unfilled] [C ____] : C [unfilled] [Ba ____] : Ba [unfilled] [PB ____] : PB [unfilled] [GH ____] : GH [unfilled] [D ____] : D [unfilled] [TVL ____] : TVL  [unfilled] [de-identified] : rectal prolapse noted  [FreeTextEntry3] : caruncle

## 2020-08-14 NOTE — HISTORY OF PRESENT ILLNESS
[Cystocele (Obstetric)] : no [Vaginal Wall Prolapse] : no [Rectal Prolapse] : no [Unable To Restrain Bowel Movement] : moderate [x1] : nocturia once nightly [Urinary Frequency] : no [Pain During Urination (Dysuria)] : no [Feelings Of Urinary Urgency] : no [Constipation Obstructed Defecation] : mild [Urinary Tract Infection] : moderate [Incomplete Emptying Of Stool] : no [Pelvic Pain] : no [] : mild [Vaginal Pain] : no [FreeTextEntry5] : none without pessary  [FreeTextEntry1] : \par Triny presents for followup with her daughter Cecilia, no vaginal bulge symptoms today, no change in urinary symptoms, overall feels no difference with the pessary out\par \par she is considering rectal prolapse management with Dr. Silva\par pelvic sonogram negative\par u/a negative today\par reports 1 UTI this year

## 2020-08-19 ENCOUNTER — OUTPATIENT (OUTPATIENT)
Dept: OUTPATIENT SERVICES | Facility: HOSPITAL | Age: 85
LOS: 1 days | End: 2020-08-19

## 2020-09-25 ENCOUNTER — OUTPATIENT (OUTPATIENT)
Dept: OUTPATIENT SERVICES | Facility: HOSPITAL | Age: 85
LOS: 1 days | End: 2020-09-25

## 2020-10-26 ENCOUNTER — APPOINTMENT (OUTPATIENT)
Dept: OBGYN | Facility: CLINIC | Age: 85
End: 2020-10-26

## 2020-11-07 ENCOUNTER — TRANSCRIPTION ENCOUNTER (OUTPATIENT)
Age: 85
End: 2020-11-07

## 2020-12-23 PROBLEM — Z87.440 HISTORY OF URINARY TRACT INFECTION: Status: RESOLVED | Noted: 2020-08-03 | Resolved: 2020-12-23

## 2020-12-26 ENCOUNTER — EMERGENCY (EMERGENCY)
Facility: HOSPITAL | Age: 85
LOS: 1 days | End: 2020-12-26
Admitting: EMERGENCY MEDICINE
Payer: MEDICARE

## 2020-12-26 ENCOUNTER — INPATIENT (INPATIENT)
Facility: HOSPITAL | Age: 85
LOS: 2 days | Discharge: HOME | DRG: 444 | End: 2020-12-29
Attending: SURGERY | Admitting: SURGERY
Payer: MEDICARE

## 2020-12-26 VITALS
HEART RATE: 80 BPM | RESPIRATION RATE: 18 BRPM | SYSTOLIC BLOOD PRESSURE: 102 MMHG | WEIGHT: 130.07 LBS | DIASTOLIC BLOOD PRESSURE: 56 MMHG | OXYGEN SATURATION: 97 % | TEMPERATURE: 98 F

## 2020-12-26 PROCEDURE — 99285 EMERGENCY DEPT VISIT HI MDM: CPT | Mod: CS

## 2020-12-26 PROCEDURE — 71045 X-RAY EXAM CHEST 1 VIEW: CPT | Mod: 26

## 2020-12-26 PROCEDURE — 76705 ECHO EXAM OF ABDOMEN: CPT | Mod: 26

## 2020-12-26 PROCEDURE — 93010 ELECTROCARDIOGRAM REPORT: CPT

## 2020-12-26 PROCEDURE — 99285 EMERGENCY DEPT VISIT HI MDM: CPT

## 2020-12-26 PROCEDURE — 71045 X-RAY EXAM CHEST 1 VIEW: CPT | Mod: 26,77

## 2020-12-27 DIAGNOSIS — Z98.890 OTHER SPECIFIED POSTPROCEDURAL STATES: Chronic | ICD-10-CM

## 2020-12-27 DIAGNOSIS — H26.9 UNSPECIFIED CATARACT: Chronic | ICD-10-CM

## 2020-12-27 DIAGNOSIS — K83.09 OTHER CHOLANGITIS: ICD-10-CM

## 2020-12-27 LAB
ALBUMIN SERPL ELPH-MCNC: 3.2 G/DL — LOW (ref 3.3–5.2)
ALBUMIN SERPL ELPH-MCNC: 3.2 G/DL — LOW (ref 3.3–5.2)
ALP SERPL-CCNC: 401 U/L — HIGH (ref 40–120)
ALP SERPL-CCNC: 405 U/L — HIGH (ref 40–120)
ALT FLD-CCNC: 241 U/L — HIGH
ALT FLD-CCNC: 264 U/L — HIGH
ANION GAP SERPL CALC-SCNC: 14 MMOL/L — SIGNIFICANT CHANGE UP (ref 5–17)
ANION GAP SERPL CALC-SCNC: 15 MMOL/L — SIGNIFICANT CHANGE UP (ref 5–17)
APTT BLD: 26.5 SEC — LOW (ref 27.5–35.5)
AST SERPL-CCNC: 314 U/L — HIGH
AST SERPL-CCNC: 472 U/L — HIGH
BASOPHILS # BLD AUTO: 0.02 K/UL — SIGNIFICANT CHANGE UP (ref 0–0.2)
BASOPHILS # BLD AUTO: 0.03 K/UL — SIGNIFICANT CHANGE UP (ref 0–0.2)
BASOPHILS NFR BLD AUTO: 0.2 % — SIGNIFICANT CHANGE UP (ref 0–2)
BASOPHILS NFR BLD AUTO: 0.3 % — SIGNIFICANT CHANGE UP (ref 0–2)
BILIRUB SERPL-MCNC: 2.2 MG/DL — HIGH (ref 0.4–2)
BILIRUB SERPL-MCNC: 3.2 MG/DL — HIGH (ref 0.4–2)
BLD GP AB SCN SERPL QL: SIGNIFICANT CHANGE UP
BUN SERPL-MCNC: 39 MG/DL — HIGH (ref 8–20)
BUN SERPL-MCNC: 40 MG/DL — HIGH (ref 8–20)
CALCIUM SERPL-MCNC: 8.3 MG/DL — LOW (ref 8.6–10.2)
CALCIUM SERPL-MCNC: 8.4 MG/DL — LOW (ref 8.6–10.2)
CHLORIDE SERPL-SCNC: 100 MMOL/L — SIGNIFICANT CHANGE UP (ref 98–107)
CHLORIDE SERPL-SCNC: 101 MMOL/L — SIGNIFICANT CHANGE UP (ref 98–107)
CO2 SERPL-SCNC: 24 MMOL/L — SIGNIFICANT CHANGE UP (ref 22–29)
CO2 SERPL-SCNC: 25 MMOL/L — SIGNIFICANT CHANGE UP (ref 22–29)
CREAT SERPL-MCNC: 1.89 MG/DL — HIGH (ref 0.5–1.3)
CREAT SERPL-MCNC: 1.94 MG/DL — HIGH (ref 0.5–1.3)
EOSINOPHIL # BLD AUTO: 0.02 K/UL — SIGNIFICANT CHANGE UP (ref 0–0.5)
EOSINOPHIL # BLD AUTO: 0.15 K/UL — SIGNIFICANT CHANGE UP (ref 0–0.5)
EOSINOPHIL NFR BLD AUTO: 0.2 % — SIGNIFICANT CHANGE UP (ref 0–6)
EOSINOPHIL NFR BLD AUTO: 1.7 % — SIGNIFICANT CHANGE UP (ref 0–6)
GLUCOSE SERPL-MCNC: 131 MG/DL — HIGH (ref 70–99)
GLUCOSE SERPL-MCNC: 99 MG/DL — SIGNIFICANT CHANGE UP (ref 70–99)
HCT VFR BLD CALC: 30.8 % — LOW (ref 34.5–45)
HCT VFR BLD CALC: 33.7 % — LOW (ref 34.5–45)
HGB BLD-MCNC: 10.5 G/DL — LOW (ref 11.5–15.5)
HGB BLD-MCNC: 9.7 G/DL — LOW (ref 11.5–15.5)
IMM GRANULOCYTES NFR BLD AUTO: 0.3 % — SIGNIFICANT CHANGE UP (ref 0–1.5)
IMM GRANULOCYTES NFR BLD AUTO: 0.3 % — SIGNIFICANT CHANGE UP (ref 0–1.5)
INR BLD: 1.13 RATIO — SIGNIFICANT CHANGE UP (ref 0.88–1.16)
LACTATE BLDV-MCNC: 0.9 MMOL/L — SIGNIFICANT CHANGE UP (ref 0.5–2)
LIDOCAIN IGE QN: 312 U/L — HIGH (ref 22–51)
LIDOCAIN IGE QN: 995 U/L — HIGH (ref 22–51)
LYMPHOCYTES # BLD AUTO: 0.52 K/UL — LOW (ref 1–3.3)
LYMPHOCYTES # BLD AUTO: 0.9 K/UL — LOW (ref 1–3.3)
LYMPHOCYTES # BLD AUTO: 5.8 % — LOW (ref 13–44)
LYMPHOCYTES # BLD AUTO: 7.8 % — LOW (ref 13–44)
MAGNESIUM SERPL-MCNC: 2 MG/DL — SIGNIFICANT CHANGE UP (ref 1.6–2.6)
MCHC RBC-ENTMCNC: 31.2 GM/DL — LOW (ref 32–36)
MCHC RBC-ENTMCNC: 31.5 GM/DL — LOW (ref 32–36)
MCHC RBC-ENTMCNC: 31.7 PG — SIGNIFICANT CHANGE UP (ref 27–34)
MCHC RBC-ENTMCNC: 31.8 PG — SIGNIFICANT CHANGE UP (ref 27–34)
MCV RBC AUTO: 100.7 FL — HIGH (ref 80–100)
MCV RBC AUTO: 102.1 FL — HIGH (ref 80–100)
MONOCYTES # BLD AUTO: 0.61 K/UL — SIGNIFICANT CHANGE UP (ref 0–0.9)
MONOCYTES # BLD AUTO: 1.07 K/UL — HIGH (ref 0–0.9)
MONOCYTES NFR BLD AUTO: 6.8 % — SIGNIFICANT CHANGE UP (ref 2–14)
MONOCYTES NFR BLD AUTO: 9.3 % — SIGNIFICANT CHANGE UP (ref 2–14)
NEUTROPHILS # BLD AUTO: 7.64 K/UL — HIGH (ref 1.8–7.4)
NEUTROPHILS # BLD AUTO: 9.45 K/UL — HIGH (ref 1.8–7.4)
NEUTROPHILS NFR BLD AUTO: 82.2 % — HIGH (ref 43–77)
NEUTROPHILS NFR BLD AUTO: 85.1 % — HIGH (ref 43–77)
PHOSPHATE SERPL-MCNC: 3.5 MG/DL — SIGNIFICANT CHANGE UP (ref 2.4–4.7)
PLATELET # BLD AUTO: 178 K/UL — SIGNIFICANT CHANGE UP (ref 150–400)
PLATELET # BLD AUTO: 185 K/UL — SIGNIFICANT CHANGE UP (ref 150–400)
POTASSIUM SERPL-MCNC: 3.6 MMOL/L — SIGNIFICANT CHANGE UP (ref 3.5–5.3)
POTASSIUM SERPL-MCNC: 3.7 MMOL/L — SIGNIFICANT CHANGE UP (ref 3.5–5.3)
POTASSIUM SERPL-SCNC: 3.6 MMOL/L — SIGNIFICANT CHANGE UP (ref 3.5–5.3)
POTASSIUM SERPL-SCNC: 3.7 MMOL/L — SIGNIFICANT CHANGE UP (ref 3.5–5.3)
PROT SERPL-MCNC: 6.4 G/DL — LOW (ref 6.6–8.7)
PROT SERPL-MCNC: 6.7 G/DL — SIGNIFICANT CHANGE UP (ref 6.6–8.7)
PROTHROM AB SERPL-ACNC: 13 SEC — SIGNIFICANT CHANGE UP (ref 10.6–13.6)
RBC # BLD: 3.06 M/UL — LOW (ref 3.8–5.2)
RBC # BLD: 3.3 M/UL — LOW (ref 3.8–5.2)
RBC # FLD: 13.6 % — SIGNIFICANT CHANGE UP (ref 10.3–14.5)
RBC # FLD: 13.8 % — SIGNIFICANT CHANGE UP (ref 10.3–14.5)
SARS-COV-2 IGG SERPL QL IA: NEGATIVE — SIGNIFICANT CHANGE UP
SARS-COV-2 IGM SERPL IA-ACNC: <0.1 INDEX — SIGNIFICANT CHANGE UP
SARS-COV-2 RNA SPEC QL NAA+PROBE: SIGNIFICANT CHANGE UP
SODIUM SERPL-SCNC: 138 MMOL/L — SIGNIFICANT CHANGE UP (ref 135–145)
SODIUM SERPL-SCNC: 140 MMOL/L — SIGNIFICANT CHANGE UP (ref 135–145)
WBC # BLD: 11.5 K/UL — HIGH (ref 3.8–10.5)
WBC # BLD: 8.98 K/UL — SIGNIFICANT CHANGE UP (ref 3.8–10.5)
WBC # FLD AUTO: 11.5 K/UL — HIGH (ref 3.8–10.5)
WBC # FLD AUTO: 8.98 K/UL — SIGNIFICANT CHANGE UP (ref 3.8–10.5)

## 2020-12-27 PROCEDURE — 99223 1ST HOSP IP/OBS HIGH 75: CPT

## 2020-12-27 PROCEDURE — 99232 SBSQ HOSP IP/OBS MODERATE 35: CPT

## 2020-12-27 PROCEDURE — 76705 ECHO EXAM OF ABDOMEN: CPT | Mod: 26

## 2020-12-27 PROCEDURE — 93010 ELECTROCARDIOGRAM REPORT: CPT | Mod: 76

## 2020-12-27 PROCEDURE — 74183 MRI ABD W/O CNTR FLWD CNTR: CPT | Mod: 26

## 2020-12-27 RX ORDER — SODIUM CHLORIDE 9 MG/ML
1000 INJECTION, SOLUTION INTRAVENOUS
Refills: 0 | Status: DISCONTINUED | OUTPATIENT
Start: 2020-12-27 | End: 2020-12-29

## 2020-12-27 RX ORDER — LATANOPROST 0.05 MG/ML
1 SOLUTION/ DROPS OPHTHALMIC; TOPICAL AT BEDTIME
Refills: 0 | Status: DISCONTINUED | OUTPATIENT
Start: 2020-12-27 | End: 2020-12-29

## 2020-12-27 RX ORDER — PIPERACILLIN AND TAZOBACTAM 4; .5 G/20ML; G/20ML
3.38 INJECTION, POWDER, LYOPHILIZED, FOR SOLUTION INTRAVENOUS ONCE
Refills: 0 | Status: COMPLETED | OUTPATIENT
Start: 2020-12-27 | End: 2020-12-27

## 2020-12-27 RX ORDER — METOPROLOL TARTRATE 50 MG
25 TABLET ORAL
Refills: 0 | Status: DISCONTINUED | OUTPATIENT
Start: 2020-12-27 | End: 2020-12-29

## 2020-12-27 RX ORDER — AMLODIPINE BESYLATE 2.5 MG/1
5 TABLET ORAL ONCE
Refills: 0 | Status: COMPLETED | OUTPATIENT
Start: 2020-12-27 | End: 2020-12-27

## 2020-12-27 RX ORDER — POTASSIUM CHLORIDE 20 MEQ
10 PACKET (EA) ORAL
Refills: 0 | Status: COMPLETED | OUTPATIENT
Start: 2020-12-27 | End: 2020-12-27

## 2020-12-27 RX ORDER — HEPARIN SODIUM 5000 [USP'U]/ML
5000 INJECTION INTRAVENOUS; SUBCUTANEOUS EVERY 12 HOURS
Refills: 0 | Status: COMPLETED | OUTPATIENT
Start: 2020-12-27 | End: 2020-12-27

## 2020-12-27 RX ORDER — LATANOPROST 0.05 MG/ML
1 SOLUTION/ DROPS OPHTHALMIC; TOPICAL
Qty: 0 | Refills: 0 | DISCHARGE

## 2020-12-27 RX ORDER — IBUPROFEN 200 MG
400 TABLET ORAL EVERY 6 HOURS
Refills: 0 | Status: DISCONTINUED | OUTPATIENT
Start: 2020-12-27 | End: 2020-12-28

## 2020-12-27 RX ORDER — SACCHAROMYCES BOULARDII 250 MG
250 POWDER IN PACKET (EA) ORAL
Refills: 0 | Status: DISCONTINUED | OUTPATIENT
Start: 2020-12-27 | End: 2020-12-29

## 2020-12-27 RX ORDER — PIPERACILLIN AND TAZOBACTAM 4; .5 G/20ML; G/20ML
3.38 INJECTION, POWDER, LYOPHILIZED, FOR SOLUTION INTRAVENOUS EVERY 12 HOURS
Refills: 0 | Status: DISCONTINUED | OUTPATIENT
Start: 2020-12-27 | End: 2020-12-29

## 2020-12-27 RX ORDER — LANOLIN ALCOHOL/MO/W.PET/CERES
1 CREAM (GRAM) TOPICAL AT BEDTIME
Refills: 0 | Status: DISCONTINUED | OUTPATIENT
Start: 2020-12-27 | End: 2020-12-29

## 2020-12-27 RX ORDER — METOPROLOL TARTRATE 50 MG
0.5 TABLET ORAL
Qty: 0 | Refills: 0 | DISCHARGE

## 2020-12-27 RX ORDER — METAPROTERENOL SULFATE
0 POWDER (GRAM) MISCELLANEOUS
Qty: 0 | Refills: 0 | DISCHARGE

## 2020-12-27 RX ORDER — PIPERACILLIN AND TAZOBACTAM 4; .5 G/20ML; G/20ML
2.25 INJECTION, POWDER, LYOPHILIZED, FOR SOLUTION INTRAVENOUS EVERY 8 HOURS
Refills: 0 | Status: DISCONTINUED | OUTPATIENT
Start: 2020-12-27 | End: 2020-12-27

## 2020-12-27 RX ORDER — AMLODIPINE BESYLATE 2.5 MG/1
5 TABLET ORAL DAILY
Refills: 0 | Status: DISCONTINUED | OUTPATIENT
Start: 2020-12-28 | End: 2020-12-29

## 2020-12-27 RX ADMIN — Medication 100 MILLIEQUIVALENT(S): at 11:37

## 2020-12-27 RX ADMIN — Medication 250 MILLIGRAM(S): at 17:51

## 2020-12-27 RX ADMIN — SODIUM CHLORIDE 100 MILLILITER(S): 9 INJECTION, SOLUTION INTRAVENOUS at 10:40

## 2020-12-27 RX ADMIN — AMLODIPINE BESYLATE 5 MILLIGRAM(S): 2.5 TABLET ORAL at 17:51

## 2020-12-27 RX ADMIN — Medication 250 MILLIGRAM(S): at 05:13

## 2020-12-27 RX ADMIN — Medication 25 MILLIGRAM(S): at 17:51

## 2020-12-27 RX ADMIN — Medication 20 MILLIGRAM(S): at 05:13

## 2020-12-27 RX ADMIN — Medication 100 MILLIEQUIVALENT(S): at 17:52

## 2020-12-27 RX ADMIN — HEPARIN SODIUM 5000 UNIT(S): 5000 INJECTION INTRAVENOUS; SUBCUTANEOUS at 17:51

## 2020-12-27 RX ADMIN — Medication 100 MILLIEQUIVALENT(S): at 16:39

## 2020-12-27 RX ADMIN — SODIUM CHLORIDE 100 MILLILITER(S): 9 INJECTION, SOLUTION INTRAVENOUS at 23:29

## 2020-12-27 RX ADMIN — PIPERACILLIN AND TAZOBACTAM 25 GRAM(S): 4; .5 INJECTION, POWDER, LYOPHILIZED, FOR SOLUTION INTRAVENOUS at 05:12

## 2020-12-27 RX ADMIN — PIPERACILLIN AND TAZOBACTAM 25 GRAM(S): 4; .5 INJECTION, POWDER, LYOPHILIZED, FOR SOLUTION INTRAVENOUS at 17:51

## 2020-12-27 NOTE — CONSULT NOTE ADULT - SUBJECTIVE AND OBJECTIVE BOX
[facility]                                                               Office:  [address] [address]                                                              Telephone: [phone]. Fax:[phone]                                                                        CARDIOLOGY CONSULTATION NOTE                                                                                             Consult requested by:  Dr. Marino  Reason for Consultation: Cardiac Risk Stratification  History obtained by: Patient and medical record   obtained: No    Chief complaint:    Patient is a 89y old  Female who presents with a chief complaint of Choledocholithiasis (27 Dec 2020 10:29)        HPI:  89y Female with PMH of HTN, CKD, and diverticulitis presents as [facility] for concerns of cholangitis and need for ERCP. Patient reports not having any pain prior to presentation. Daughter felt she appeared more weak and tired, which patient corroborates. Her appetite has also been decreased, but does not have to do with pain. Patient denies nausea, vomiting subjective fevers, chills, chest pain, or trouble breathing. Patient expresses she is "very healthy." Bowel movements and urination have been normal for her. Occasional bloody streaks on toilet paper, but patient with known rectal prolapse history. At [facility] patient had TMax of 102.2F, leukocytosis, and abnormally elevate LFT's. Concern was for cholangitis and patient transferred for need of ERCP. She is non-toxic appearing, clinically and hemodynamically stable. Of note, daughter Cecilia ([phone]) denies any cardiac or pulmonary history.  (27 Dec 2020 02:16)    Above Appreciated  Cardiology consult requested for cardiac risk stratification for ERCP. Pt states her daughter thought she did not look well and brought her to [facility] where she was found to have cholangitis. Pt was transferred to [facility] for further evaluation. Pt denies chest pain, palpitations, SOB, fevers, chills, cough, n/v/d, abd. pain. Pt states she is able to ambulate with walker without eliciting anginal symptoms. Pt has no recent cardiac w/u.         REVIEW OF SYMPTOMS:     CONSTITUTIONAL: No fever, weight loss, or fatigue  ENMT:  No difficulty hearing, tinnitus, vertigo; No sinus or throat pain  NECK: No pain or stiffness  CARDIOVASCULAR: See HPI  RESPIRATORY: No Dyspnea on exertion, Shortness of breath, cough, wheezing  : No dysuria, no hematuria   GI: No dark color stool, no melena, no diarrhea, no constipation, no abdominal pain   NEURO: No headache, no dizziness, no slurred speech   MUSCULOSKELETAL: No joint pain or swelling; No muscle, back, or extremity pain  PSYCH: No agitation, no anxiety.    ALL OTHER REVIEW OF SYSTEMS ARE NEGATIVE.      PREVIOUS DIAGNOSTIC TESTING  ECHO FINDINGS: pending    ALLERGIES: Allergies    No Known Allergies    Intolerances    PAST MEDICAL HISTORY  Bilateral edema of lower extremity    Rectocele    History of Clostridioides difficile infection    Right femoral vein DVT    Uterine prolapse    Rectal prolapse    Diverticulitis    Chronic kidney disease (CKD)    UTI (urinary tract infection)        PAST SURGICAL HISTORY  Cataract, right eye    H/O left wrist surgery        FAMILY HISTORY:      SOCIAL HISTORY:  Denies smoking/alcohol/drugs  CIGARETTES:     ALCOHOL:  DRUGS:      CURRENT MEDICATIONS:  metoprolol tartrate 25 milliGRAM(s) Oral two times a day  torsemide 20 milliGRAM(s) Oral daily  heparin   Injectable  latanoprost 0.005% Ophthalmic Solution  multiple electrolytes Injection Type 1  piperacillin/tazobactam IVPB..  potassium chloride  10 mEq/100 mL IVPB  saccharomyces boulardii        HOME MEDICATIONS:    metaproterenol:  (27 Dec 2020 02:44)  Metoprolol Tartrate 50 mg oral tablet: 0.5 tab(s) orally 2 times a day (27 Dec 2020 02:20)  torsemide 20 mg oral tablet: 1 tab(s) orally once a day (27 Dec 2020 02:42)  Xalatan 0.005% ophthalmic solution: 1 drop(s) to each affected eye once a day (in the evening) (27 Dec 2020 02:20)      Vital Signs Last 24 Hrs  T(C): 36.7 (27 Dec 2020 08:08), Max: 36.8 (26 Dec 2020 23:05)  T(F): 98.1 (27 Dec 2020 08:08), Max: 98.2 (26 Dec 2020 23:05)  HR: 84 (27 Dec 2020 08:08) (79 - 84)  BP: 165/80 (27 Dec 2020 08:08) (102/56 - 165/80)  RR: 18 (27 Dec 2020 08:08) (18 - 18)  SpO2: 97% (27 Dec 2020 08:08) (94% - 97%)      PHYSICAL EXAM:  Constitutional: Comfortable . No acute distress.   HEENT: Atraumatic and normocephalic , neck is supple . no JVD. No carotid bruit. PEERL   CNS: A&Ox3. No focal deficits. EOMI.   Lymph Nodes: Cervical : Not palpable.  Respiratory: CTAB  Cardiovascular: S1S2 RRR. + sys. murmur grade II/VI  Gastrointestinal: Soft non-tender and non distended . +Bowel sounds. negative Hanna's sign  Extremities: BIlateral +3/+3, nonpitting edema.   Psychiatric: Calm . no agitation.  Skin: No skin rash/ulcers visualized to face, hands or feet.    Intake and output:     LABS:                        10.5   11.50 )-----------( 178      ( 27 Dec 2020 08:10 )             33.7     12-27    140  |  101  |  39.0<H>  ----------------------------<  99  3.6   |  25.0  |  1.94<H>    Ca    8.4<L>      27 Dec 2020 08:10  Phos  3.5     12-27  Mg     2.0     12-27    TPro  6.7  /  Alb  3.2<L>  /  TBili  3.2<H>  /  DBili  x   /  AST  314<H>  /  ALT  241<H>  /  AlkPhos  401<H>  12-27      ;p-BNP=  PT/INR - ( 27 Dec 2020 00:16 )   PT: 13.0 sec;   INR: 1.13 ratio         PTT - ( 27 Dec 2020 00:16 )  PTT:26.5 sec      INTERPRETATION OF TELEMETRY:  No tele  ECG:  NSR HR @ 84    RADIOLOGY & ADDITIONAL STUDIES:    X-ray: < from: Xray Chest 1 View AP/PA. (12.26.20 @ 23:55) >  FINDINGS: There is a LEFT lower lobe retrocardiac airspace consolidation and/or mass. Confirmatory lateral radiograph or CT recommended.  Remaining lung parenchyma clear. Is mamillation of the RIGHT hemidiaphragm.    Cardiac chambers normal size. No hilar mass.    Visualized osseous structures are intact.        IMPRESSION:  LEFT retrocardiac airspace consolidation and/or mass. Confirmatory lateral radiograph or CT recommended.

## 2020-12-27 NOTE — H&P ADULT - HISTORY OF PRESENT ILLNESS
89y Female with PMH of HTN, CKD, and diverticulitis presents as transfer from [facility] for concerns of cholangitis and need for ERCP. Patient reports not having any pain prior to presentation. Daughter felt she appeared more weak and tired, which patient corroborates. Her appetite has also been decreased, but does not have to do with pain. Patient denies nausea, vomiting subjective fevers, chills, chest pain, or trouble breathing. Patient expresses she is "very healthy." Bowel movements and urination have been normal for her. Occasional bloody streaks on toilet paper, but patient with known rectal prolapse history. At [facility] patient had TMax of 102.2F, leukocytosis, and abnormally elevate LFT's. Concern was for cholangitis and patient transferred for need of ERCP. She is non-toxic appearing, clinically and hemodynamically stable. Of note, daughter Cecilia ([phone]) denies any cardiac or pulmonary history.

## 2020-12-27 NOTE — ED ADULT NURSE NOTE - OBJECTIVE STATEMENT
Assumed pt care @ this time. Pt received A&Ox4 [place] [facility] with [place] complaints @ this time. [place] [facility] [place]. As per EMS, pt received acetaminophen and zosyn PTA. Pt received with 20g LAC in place, patent with good blood return, dressing dry and intact. Pt denies any HA, fever, chills, dizziness, blurred vision, SOB, chest pain, NVD, dysuria.  Resp even and unlabored. NAD present. Pt resting comfortably in stretcher w/call bell in reach. Pt safety maintained. Pt made aware of plan of care and verbalized understanding.

## 2020-12-27 NOTE — H&P ADULT - NSHPPHYSICALEXAM_GEN_ALL_CORE
General/Neuro  GCS: 15  Exam: Normal, NAD, alert, oriented x 2, no focal deficits. Strabismus, anicteric     Respiratory  Exam: Lungs clear to auscultation, Normal expansion/effort.     Cardiovascular  Exam: S1, S2.  Regular rate and rhythm.    Cardiac Rhythm: Normal Sinus Rhythm    GI  Exam: Abdomen soft, Non-tender, Non-distended.  Mild discomfort on palpation of epigastrium / RUQ. No rebound tenderness or guarding. Negative Hanna's sign.     Extremities  Exam: Extremities warm, pink, well-perfused. Peripheral edema in b/l lower extremities. No ulcers or lesions. Left lateral calf with bandage from where patient was scraped by car door.

## 2020-12-27 NOTE — ED PROVIDER NOTE - PHYSICAL EXAMINATION
General:     NAD, jaundices  Head:     NC/AT, EOMI, oral mucosa moist, scleral icterus  Neck:     trachea midline  Lungs:     CTA b/l, no w/r/r  CVS:     S1S2, RRR, no m/g/r  Abd:     +BS, ttp @ epigastrium, no rebound or guarding, s/nd, no organomegaly  Ext:    2+ radial and pedal pulses, no c/c/e  Neuro: AAOx2, no sensory/motor deficits

## 2020-12-27 NOTE — CONSULT NOTE ADULT - ASSESSMENT
89yo F with PMH of HTN, CKD, and diverticulitis presents as [place] [facility] concerns of cholangitis. Patient without any current complaints of pain, though found at [facility] with fever, leukocytosis, and abnormally elevated LFT's. Here, patient has been afebrile with normal WBC and mentation, though LFT's remain abnormal and with an elevated lipase. US with findings of borderline thickened gallbladder wall, cholelithiasis, and possibly trace pericholecystic fluid. Patient likely with choledocholithiasis with or without cholecystitis. Clinically and hemodynamically stable at this time.    Choledocholithiasis   - Admit to ACS under Dr. Negro Brice.   - To consult GI for ERCP  - NPO / IVF  - IV Abx : Zosyn  - DVT PPx  - Repeat AM Labs    Code Status: Full Code

## 2020-12-27 NOTE — CONSULT NOTE ADULT - SUBJECTIVE AND OBJECTIVE BOX
HISTORY OF PRESENT ILLNESS: This is a 89y old woman with a past medical history significant for HTN, CKD, and diverticulitis who was transferred from [facility] for concerns of cholangitis and need for ERCP.  The patient denies any abdominal pain, nausea or vomiting.  Her daughter felt she appeared more weak and tired, which patient corroborated.  Her appetite has also been decreased, but does not have to do with pain.  The patient denies nausea, vomiting, subjective fevers, chills, chest pain, or trouble breathing.  Patient expresses she is "very healthy."  Bowel movements and urination have been normal for her.  Occasional bloody streaks on toilet paper, but patient with known rectal prolapse history.  At [facility] patient had TMax of 102.2F, leukocytosis, and abnormally elevate LFT's.  Concern was for cholangitis and patient transferred for need of ERCP.  She is non-toxic appearing, clinically and hemodynamically stable. Of note, daughter Cecilia ([phone]) denies any cardiac or pulmonary history.  Most recent echocardiogram showed normal LV size and systolic function, mild MR, mild TR, and impaired LV relaxation.    ROS: A 14-point review of systems was completed and was otherwise negative save what was reported in the HPI.    PAST MEDICAL/SURGICAL HISTORY:  Bilateral edema of lower extremity  Rectocele  History of Clostridioides difficile infection  Right femoral vein DVT, Chronic  Uterine prolapse  Rectal prolapse  Diverticulitis  Chronic kidney disease (CKD)  UTI (urinary tract infection), E. coli  Cataract, right eye  H/O left wrist surgery    SOCIAL HISTORY:  - TOBACCO: Denies  - ALCOHOL: Denies  - ILLICIT DRUG USE: Denies    FAMILY HISTORY:  No known history of gastrointestinal or liver disease;      HOME MEDICATIONS:  metaproterenol:  (27 Dec 2020 02:44)  Metoprolol Tartrate 50 mg oral tablet: 0.5 tab(s) orally 2 times a day (27 Dec 2020 02:20)  torsemide 20 mg oral tablet: 1 tab(s) orally once a day (27 Dec 2020 02:42)  Xalatan 0.005% ophthalmic solution: 1 drop(s) to each affected eye once a day (in the evening) (27 Dec 2020 02:20)    INPATIENT MEDICATIONS:  MEDICATIONS  (STANDING):  heparin   Injectable 5000 Unit(s) SubCutaneous every 12 hours  latanoprost 0.005% Ophthalmic Solution 1 Drop(s) Both EYES at bedtime  metoprolol tartrate 25 milliGRAM(s) Oral two times a day  multiple electrolytes Injection Type 1 1000 milliLiter(s) (100 mL/Hr) IV Continuous <Continuous>  piperacillin/tazobactam IVPB.. 3.375 Gram(s) IV Intermittent every 12 hours  potassium chloride  10 mEq/100 mL IVPB 10 milliEquivalent(s) IV Intermittent every 1 hour  saccharomyces boulardii 250 milliGRAM(s) Oral two times a day  torsemide 20 milliGRAM(s) Oral daily    MEDICATIONS  (PRN):  ibuprofen  Tablet. 400 milliGRAM(s) Oral every 6 hours PRN Moderate Pain (4 - 6)    ALLERGIES:  No Known Allergies    T(C): 36.7 (12-27-20 @ 08:08), Max: 36.8 (12-26-20 @ 23:05)  HR: 84 (12-27-20 @ 08:08) (79 - 84)  BP: 165/80 (12-27-20 @ 08:08) (102/56 - 165/80)  RR: 18 (12-27-20 @ 08:08) (18 - 18)  SpO2: 97% (12-27-20 @ 08:08) (94% - 97%)      PHYSICAL EXAM:  Constitutional: Well-developed, adequately nourished, very elderly, pleasant  woman in no apparent distress  Eyes: Sclerae anicteric, conjunctivae normal  ENMT: Mucus membranes moist, no oropharyngeal thrush noted  Neck: No thyroid nodules appreciated, no significant cervical or supraclavicular lymphadenopathy  Respiratory: Breathing nonlabored; clear to auscultation  Cardiovascular: Regular rate and rhythm  Gastrointestinal: Soft, nontender, nondistended, normoactive bowel sounds; no hepatosplenomegaly appreciated; no rebound tenderness or involuntary guarding  Rectal: Perianal exam within normal limits; normal sphincter tone; brown stool on glove  Extremities: No clubbing or cyanosis; 1+ pretibial edema  Neurological: Alert and oriented to person, place and time; no asterixis  Skin: No jaundice  Lymph Nodes: No significant lymphadenopathy  Musculoskeletal: No significant peripheral atrophy  Psychiatric: Affect and mood appropriate      LABS:             10.5   11.50 )-----------( 178      ( 12-27 @ 08:10 )             33.7                9.7    8.98  )-----------( 185      ( 12-27 @ 00:16 )             30.8       PT/INR - ( 27 Dec 2020 00:16 )   PT: 13.0 sec;   INR: 1.13 ratio         PTT - ( 27 Dec 2020 00:16 )  PTT:26.5 sec  12-27    140  |  101  |  39.0<H>  ----------------------------<  99  3.6   |  25.0  |  1.94<H>    Ca    8.4<L>      27 Dec 2020 08:10  Phos  3.5     12-27  Mg     2.0     12-27    TPro  6.7  /  Alb  3.2<L>  /  TBili  3.2<H>  /  DBili  x   /  AST  314<H>  /  ALT  241<H>  /  AlkPhos  401<H>  12-27    LIVER FUNCTIONS - ( 27 Dec 2020 08:10 )  Alb: 3.2 g/dL / Pro: 6.7 g/dL / ALK PHOS: 401 U/L / ALT: 241 U/L / AST: 314 U/L / GGT: x           Lipase, Serum: 312 U/L (12-27-20 @ 08:10)  Lipase, Serum: 995 U/L (12-27-20 @ 00:16)    IMAGING: I personally reviewed the ultrasound, and I agree with the radiologist's interpretation as described below:    < from: US Gallbladder (US Gallbladder .) (12.27.20 @ 00:56) >   EXAM:  US GALLBLADDER                          PROCEDURE DATE:  12/27/2020          INTERPRETATION:  HISTORY: New onset jaundice.    TECHNIQUE: Right upper quadrant abdominal sonogram was performed.    COMPARISON: Right upper quadrant sonogram 12/26/2020    FINDINGS:  The liver is normal in size and contour. There is normal hepatic echotexture without discrete lesion. There is no intra or extrahepatic biliary dilatation. The common bile duct measures 5 mm. The gallbladder is again distended with multiple gallstones. Wall thickness is upper limits of normal at 2.5 mm. Trace free fluid is questioned adjacent to the gallbladder.    The pancreas is not well-visualized due to overlying bowel gas. Visualized portions are unremarkable. There is no ascites.    The right kidney measures 9.7 cm without hydronephrosis or shadowing calculus. Echogenic appearance to the parenchyma is unchanged as is a mid pole lateral cyst.    Atherosclerotic changes of the aorta. IVC within normal limits.    IMPRESSION:  No significant interval change. Distended gallbladder with gallstones and upper limited normal wall thickness. Questionable trace pericholecystic fluid. Correlation with nuclear medicine DISIDA scan is recommended    < end of copied text >

## 2020-12-27 NOTE — ED ADULT NURSE NOTE - INTERVENTIONS DEFINITIONS
[place] to call system/Instruct patient to call for assistance/Stretcher in lowest position, wheels locked, appropriate side rails in place/Monitor gait and stability/Monitor for mental status changes and reorient to person, place, and time/Review medications for side effects contributing to fall risk/Reinforce activity limits and safety measures with patient and family

## 2020-12-27 NOTE — CONSULT NOTE ADULT - ASSESSMENT
Patient presenting with new onset fatigue, found to have fever at [facility], leukocytosis, mild elevations in liver chemistries and ultrasounds concerning for cholecystitis.  Because she has no biliary ductal dilatation, I would be concerned about cholecystitis.  It may be worthwhile to have patient undergo a HIDA scan in addition to MRCP prior to subjecting her to an EUS/ERCP.  Agree with broad spectrum antibiotics.  Please have the patient evaluated by cardiology for possible procedures tomorrow.  NPO p MN.    Please call with questions/concerns.

## 2020-12-27 NOTE — ED ADULT NURSE REASSESSMENT NOTE - NS ED NURSE REASSESS COMMENT FT1
Pt placed on purewick external female urinary catheter. Pt repositioned and resting comfortably in stretcher @ this time. Respirations even & unlabored. NAD present. Pending US results.

## 2020-12-27 NOTE — ED PROVIDER NOTE - NS ED ROS FT
Constitutional: (+) fever  (-)chills  (-)sweats  Eyes/ENT: (-)   Cardiovascular: (-) chest pain, (-) palpitations (-) edema   Respiratory: (-) cough, (-) shortness of breath   Gastrointestinal: (+)nausea  (-)vomiting, (-) diarrhea  (+) abdominal pain   :  (-)dysuria, (-)frequency, (-)urgency, (-)hematuria  Musculoskeletal: (-) neck pain, (-) back pain, (-) joint pain  Integumentary: (-) rash, (-) edema  Neurological: (-) headache, (-) altered mental status  (-)LOC

## 2020-12-27 NOTE — ED PROVIDER NOTE - CLINICAL SUMMARY MEDICAL DECISION MAKING FREE TEXT BOX
abd pain, fever, elevated LFTS: acute cholangitis,  repeat labs, us. signed out pending results. surgery contacted upon patient arrival

## 2020-12-27 NOTE — H&P ADULT - ASSESSMENT
89yo F with PMH of HTN, CKD, and diverticulitis presents as transfer from [facility] for concerns of [place]angitis. Patient without any current complaints of pain, though found at [facility] with fever, leukocytosis, and abnormally elevated LFT's. Here, patient has been afebrile with normal WBC and mentation, though LFT's remain abnormal and with an elevated lipase. US with findings of borderline thickened gallbladder wall, cholelithiasis, and possibly trace pericholecystic fluid. Patient likely with choledocholithiasis with or without cholecystitis. Clinically and hemodynamically stable at this time.    Choledocholithiasis   - Admit to ACS under Dr. Negro Brice.   - To consult GI for ERCP  - NPO / IVF  - IV Abx : Zosyn  - DVT PPx  - Repeat AM Labs    Code Status: Full Code

## 2020-12-27 NOTE — ED PROVIDER NOTE - OBJECTIVE STATEMENT
89 yo female with a PMHx of HTN, chronic LE edema, DVT of right femoral vein, rectal prolapse, and recurrent C. Diff infections; now p/w abd pain and fever, as transfer from [facility] [place]. patient reports generalized malaise and weakness since yesterday. c/o mild nausea and abd pain with po intake. abd pain--epigastric, intermittent. denies f/c/s. went to [facility] where she was found to have fever, tachycardia, new onset jaundice, elevated LFTs and multiple stone in gallbladder with distention. given zosyn and fluids at [facility]. accepted to [facility] for ERCP/Surgical evaluation.   PMH: HTN, chronic LE edema, DVT of right femoral vein, rectal prolapse, and recurrent C. Diff infections  SOCIAL: no substance abuse

## 2020-12-27 NOTE — ED ADULT NURSE NOTE - FINAL NURSING ELECTRONIC SIGNATURE
Patient:   KANDIS ADAMS            MRN: [id number]            FIN: [id number]              Age:   69 years     Sex:  FEMALE     :  50   Associated Diagnoses:   None   Author:   HUSAM RED     Referring Physician: David    Reason for Consult: Postop medical comanagement    History of present illness:  Mrs. Adams is a 70 yo F with hx of diabetes, hypertension, asthma, hyperparathyroidism admitted s/p parathyroidectomy.  She had pain immediately postop but is relieved after morphine.  Was able to tolerate dinner.  No SoB, chest pain, lightheadedness or dizziness.  Feeling overall well.  Takes her glargine usually around 11pm.    Review of Systems:  Constitutional: Negative other than as per history of present illness  Skin: Negative other than as per history of present illness  Eyes: Negative other than as per history of present illness  ENT: Negative other than as per history of present illness  Endocrine: Negative other than as per history of present illness  Cardiovascular: Negative other than as per history of present illness  Respiratory: Negative other than as per history of present illness  Gastrointestinal: Negative other than as per history of present illness  Musculoskeletal: Negative other than as per history of present illness  Neurologic: Negative other than as per history of present illness  Hematologic: Negative other than as per history of present illness  Psychiatric: Negative other than as per history of present illness    Past medical history:  Diabetes  Hypertension  Hyperparathyroidism  Anxiety  Asthma    Family history:   No early heart disease    Social history:  Alcohol  Details: Use: rarely.  Exercise  Details: Exercise: Regularly.  Times Per Week: 3-4 times/week.  Type: Walking.  Sexual  Details: Sexual orientation: Straight or heterosexual.  Gender Identity: Identifies as female.  Preferred Pronouns: Female.  Female Birth Sex:.  Substance Abuse  Details: Use:  None.  Tobacco  Details: Smoked/Smokeless Tobacco Last 30 Days: No.  Smoking Tobacco Use: Never smoker.  Smokeless Tobacco Use Never.  Cultural/Quaker Practices  Details: Quaker or Cultural Practices: Muslim.    Home Medications (10) Active  acetaminophen 650 mg oral tablet, extended release 650 mg = 1 tab, PRN, Oral, Q8H  albuterol HFA inhaler oral 90 mcg/puff 180 mcg = 2 puff, PRN, MDI/DPI, Q6hr  alendronate 70 mg oral tablet 70 mg = 1 tab, Oral, Q Wednesday  amLODIPine oral 5 mg tablet 5 mg = 1 tab, Oral, Daily  aspirin oral 81 mg DR tablet 81 mg = 1 tab, Oral, Daily  Basaglar KwikPen (insulin glargine) 100 units/mL subcutaneous solution 30 unit, Subcutaneous, Q Bedtime  linagliptin oral 5 mg tablet 5 mg = 1 tab, Oral, Daily  lisinopril oral 20 mg tablet 20 mg = 1 tab, Oral, Daily  metFORMIN oral 1,000 mg tablet 1,000 mg = 1 tab, Oral, BID  rosuvastatin 10 mg oral capsule 10 mg = 1 cap, Oral, Daily    Medications (14) Active  Scheduled: (7)  AmLODIPine 5 mg tab  5 mg 1 tab, Oral, Daily  Calcitriol 0.25 mcg cap  0.25 mcg 1 cap, Oral, Q6H  Calcium carbonate 500 mg chew tab [Ca 200 mg]  1,000 mg 2 tab, Chewed, TID  ceFAZolin  2,000 mg 15 mL, Slow IV Push, Q8H  EPINEPHrine PF 1 mg/mL 0.5 mg + sodium chloride PF 0.9% 9.5 mL  0.5 mg 0.5 mL, Topical, On Call - Send to OR  insulin glargine  20 unit 0.2 mL, Subcutaneous, Q Bedtime  Pneumococcal adult vaccine 23-polyvalent 0.5 mL IM inj SDV  0.5 mL, IM, On Call  Continuous: (1)  Lactated Ringers 1,000 mL  1,000 mL, IV, 100 mL/hr  PRN: (6)  FentaNYL 100 mcg/2 mL inj SDV  25 mcg 0.5 mL, Slow IV Push, Q5 Minutes  Hydrocodone-acetaminophen 5-325 mg tab  1 tab, Oral, Q6H  HYDROmorphone PF 1 mg/1 mL inj SDV  0.2 mg 0.2 mL, Slow IV Push, Q5 Minutes  Morphine PF 2 mg/1 mL inj SDV  2 mg 1 mL, IV Push, Q2H  Ondansetron 4 mg/2 mL inj SDV  4 mg 2 mL, Slow IV Push, Q6H  Sodium chloride PF 0.9% flush inj 10 mL  2 mL, Flush, As Directed PRN     Allergies (7) Active  Reaction  Contrast Dye iodine, Swelling  edible seeds Swelling  rajeev Swelling  papaya Swelling  Strawberry Swelling  coconut None Documented  povidone iodine topical None Documented    Vitals between:   26-SEP-2019 21:15:10   TO   27-SEP-2019 21:15:10                   LAST RESULT MINIMUM MAXIMUM  Temperature 36.4 36.0 36.7  Heart Rate 100 73 107  Respiratory Rate 20 11 34  NISBP           125 120 171  NIDBP           76 63 91  NIMBP           92 83 118  SpO2                    95 95 100    Physical exam:  General: No distress  HEENT: Normocephalic, EOMI, Moist mucosa  Neck: Supple  Chest: Unlabored breathing, breath sounds equal  Cardiac: No murmur, no rubs  Abdomen: Soft, nontender, no distension  Musculoskeletal: No gross abnormalities  Neurologic: No focal sensory or motor deficits  Vascular: Equal pulses in extremities  Skin: Normal turgor  Psychiatric: Cooperative    Labs:     Labs between:  26-SEP-2019 21:15 to 27-SEP-2019 21:15    BMP:                 Na  Cl  BUN  Glu   27-SEP-2019                                     K  CO2  Cr  Ca                                    10.2     POC GLU:                 Latest Result  Latest Date  Minimum  Min Date  Maximum  Max Date                             (H) 233  27-SEP-2019 (H) 233  27-SEP-2019 85 27-SEP-2019    COAG:                 INR  PT  PTT  Ddimer  Fibrinogen    27-SEP-2019     25                        Radiology:     Result title:  NM SESTAMIBI INJ FOR PARATHYROID SURGERY  Result status:  Final  Verified by:  SUSAN MCLEAN on 09/27/2019 8:29  IMPRESSION: 27.8 mCi of technetium 99m sestamibi was injected intravenously prior to surgery.  No images obtained per surgeon's request.    Assessment/Plan:   Mrs. Adams is a 68 yo F with hx of diabetes, hypertension, asthma, hyperparathyroidism admitted s/p parathyroidectomy.    #Hyperparathyroidism  -S/p parathyroidectomy  -Serial calcium, PTH monitoring  -Comanage with ENT    #Hypertension  -Hold amlodipine,  lisinopril today    #Diabetes  -Hold metformin  -Resume glargine 20 units qpm for now  (home dose 30)    #Dyslipidemia  -Resume statin    Thank you for this consultation!    Beny Sousa D.O.   [facility] Hospitalist   27-Dec-2020 02:41

## 2020-12-27 NOTE — CONSULT NOTE ADULT - SUBJECTIVE AND OBJECTIVE BOX
Consult placed for 89yoF transferred from [facility] reportedly with abdominal pain and jaundice, lab work and imaging concerning for cholangitis. Went urgently to ED, but patient at [facility]. Will continue to go through paper transfer chart from [facility] and evaluate patient upon return from study.   Consult placed for 89yoF [place] [facility] [place] pain and jaundice, lab work and imaging concerning for cholangitis. Went urgently to ED, but patient at [facility]. Will continue to go through paper transfer chart [place] [facility] and evaluate patient upon return from study.          SURGERY CONSULT  =====================================================================  HPI:   89y Female with PMH of HTN, UTI, CKD, and diverticulitis presents as [place] [facility] [place] concerns of cholangitis and need for ERCP. Patient reports...      PAST MEDICAL & SURGICAL HISTORY:  HTN  UTI  CKD 3  Diverticulitis  C. diff Infection  Glaucoma  Rectal prolapse  uterine prolapse  Chronic LE edema  Chronic Right Femoral Vein DVT    Wrist Surgery  Ankle Surgery  Right Eye Cataract Sx - Implant          Home Medications:  Metoprolol  Torsemide  Amoxicillin  Xalatan  Metaproterenol     Allergies:  No Known Allergies    Intolerances      Soc:   Advanced Directives: Presumed Full Code     CURRENT MEDICATIONS:   --------------------------------------------------------------------------------------  Neurologic Medications    Respiratory Medications    Cardiovascular Medications    Gastrointestinal Medications    Genitourinary Medications    Hematologic/Oncologic Medications    Antimicrobial/Immunologic Medications    Endocrine/Metabolic Medications    Topical/Other Medications    --------------------------------------------------------------------------------------    VITAL SIGNS, INS/OUTS (last 24 hours):  --------------------------------------------------------------------------------------  ICU Vital Signs Last 24 Hrs  T(C): 36.8 (26 Dec 2020 23:05), Max: 36.8 (26 Dec 2020 23:05)  T(F): 98.2 (26 Dec 2020 23:05), Max: 98.2 (26 Dec 2020 23:05)  HR: 80 (26 Dec 2020 23:05) (80 - 80)  BP: 102/56 (26 Dec 2020 23:05) (102/56 - 102/56)  BP(mean): --  ABP: --  ABP(mean): --  RR: 18 (26 Dec 2020 23:05) (18 - 18)  SpO2: 97% (26 Dec 2020 23:05) (97% - 97%)    I&O's Summary    --------------------------------------------------------------------------------------    EXAM:  General/Neuro  GCS:   Exam: Normal, NAD, alert, oriented x 3, no focal deficits. PERRLA  ***    Respiratory  Exam: Lungs clear to auscultation, Normal expansion/effort.  ***    Cardiovascular  Exam: S1, S2.  Regular rate and rhythm.  Peripheral edema  ***  Cardiac Rhythm: Normal Sinus Rhythm    GI  Exam: Abdomen soft, Non-tender, Non-distended.    Wound:   ***    Extremities  Exam: Extremities warm, pink, well-perfused.      Derm:  Exam: Good skin turgor, no skin breakdown.      LABS  --------------------------------------------------------------------------------------  Labs:  CAPILLARY BLOOD GLUCOSE                              9.7    8.98  )-----------( 185      ( 27 Dec 2020 00:16 )             30.8       Auto Neutrophil %: 85.1 % (12-27-20 @ 00:16)  Auto Immature Granulocyte %: 0.3 % (12-27-20 @ 00:16)            LFTs:     Blood Gas Venous - Lactate: 0.9 mmoL/L (12-27-20 @ 00:16)      Coags:                  --------------------------------------------------------------------------------------    OTHER LABS    IMAGING RESULTS  ****************      ASSESSMENT/ PLAN:  89y Female ***      Attending aware and agrees with plan SURGERY CONSULT  =====================================================================  HPI:   89y Female with PMH of HTN, CKD, and diverticulitis presents as transfer from [facility] for concerns of cholangitis and need for ERCP. Patient reports not having any pain prior to presentation. Daughter felt she appeared more weak and tired, which patient corroborates. Her appetite has also been decreased, but does not have to do with pain. Patient denies nausea, vomiting subjective fevers, chills, chest pain, or trouble breathing. Patient expresses she is "very healthy." Bowel movements and urination have been normal for her. Occasional bloody streaks on toilet paper, but patient with known rectal prolapse history. At [facility] patient had TMax of 102.2F, leukocytosis, and abnormally elevate LFT's. Concern was for cholangitis and patient transferred for need of ERCP. She is non-toxic appearing, clinically and hemodynamically stable. Of note, daughter Cecilia ([phone]) denies any cardiac or pulmonary history.         PAST MEDICAL & SURGICAL HISTORY:  HTN  UTI  CKD 3  Diverticulitis  C. diff Infection  Glaucoma  Rectal prolapse  uterine prolapse  Chronic LE edema  Chronic Right Femoral Vein DVT    Left Wrist Surgery  Right Eye Cataract Sx - Implant          Home Medications:  Metoprolol  Torsemide  Amoxicillin  Xalatan  Metaproterenol     Allergies:  No Known Allergies    Intolerances      Soc:   Advanced Directives: Full Code, per daughter and patient.     CURRENT MEDICATIONS:   --------------------------------------------------------------------------------------  Neurologic Medications    Respiratory Medications    Cardiovascular Medications    Gastrointestinal Medications    Genitourinary Medications    Hematologic/Oncologic Medications    Antimicrobial/Immunologic Medications    Endocrine/Metabolic Medications    Topical/Other Medications    --------------------------------------------------------------------------------------    VITAL SIGNS, INS/OUTS (last 24 hours):  --------------------------------------------------------------------------------------  ICU Vital Signs Last 24 Hrs  T(C): 36.8 (26 Dec 2020 23:05), Max: 36.8 (26 Dec 2020 23:05)  T(F): 98.2 (26 Dec 2020 23:05), Max: 98.2 (26 Dec 2020 23:05)  HR: 80 (26 Dec 2020 23:05) (80 - 80)  BP: 102/56 (26 Dec 2020 23:05) (102/56 - 102/56)  BP(mean): --  ABP: --  ABP(mean): --  RR: 18 (26 Dec 2020 23:05) (18 - 18)  SpO2: 97% (26 Dec 2020 23:05) (97% - 97%)    I&O's Summary    --------------------------------------------------------------------------------------    EXAM:  General/Neuro  GCS: 15  Exam: Normal, NAD, alert, oriented x 2, no focal deficits. Strabismus, anicteric     Respiratory  Exam: Lungs clear to auscultation, Normal expansion/effort.     Cardiovascular  Exam: S1, S2.  Regular rate and rhythm.    Cardiac Rhythm: Normal Sinus Rhythm    GI  Exam: Abdomen soft, Non-tender, Non-distended.  Mild discomfort on palpation of epigastrium / RUQ. No rebound tenderness or guarding. Negative Hanna's sign.     Extremities  Exam: Extremities warm, pink, well-perfused. Peripheral edema in b/l lower extremities. No ulcers or lesions. Left lateral calf with bandage from where patient was scraped by car door.       LABS  --------------------------------------------------------------------------------------  Labs:  CAPILLARY BLOOD GLUCOSE                              9.7    8.98  )-----------( 185      ( 27 Dec 2020 00:16 )             30.8       Auto Neutrophil %: 85.1 % (12-27-20 @ 00:16)  Auto Immature Granulocyte %: 0.3 % (12-27-20 @ 00:16)    12-27    138  |  100  |  40.0<H>  ----------------------------<  131<H>  3.7   |  24.0  |  1.89<H>      Calcium, Total Serum: 8.3 mg/dL (12-27-20 @ 00:16)      LFTs:             6.4  | 2.2  | 472      ------------------[405     ( 27 Dec 2020 00:16 )  3.2  | x    | 264         Lipase:995    Amylase:x         Blood Gas Venous - Lactate: 0.9 mmoL/L (12-27-20 @ 00:16)      Coags:     13.0   ----< 1.13    ( 27 Dec 2020 00:16 )     26.5          --------------------------------------------------------------------------------------      IMAGING RESULTS  RUQ US:  FINDINGS:  The liver is normal in size and contour. There is normal hepatic echotexture without discrete lesion. There is no intra or extrahepatic biliary dilatation. The common bile duct measures 5 mm. The gallbladder is again distended with multiple gallstones. Wall thickness is upper limits of normal at 2.5 mm. Trace free fluid is questioned adjacent to the gallbladder.    The pancreas is not well-visualized due to overlying bowel gas. Visualized portions are unremarkable. There is no ascites.    The right kidney measures 9.7 cm without hydronephrosis or shadowing calculus. Echogenic appearance to the parenchyma is unchanged as is a mid pole lateral cyst.    Atherosclerotic changes of the aorta. IVC within normal limits.    IMPRESSION:  No significant interval change. Distended gallbladder with gallstones and upper limited normal wall thickness. Questionable trace pericholecystic fluid. Correlation with nuclear medicine DISIDA scan is recommended      ALDAIR LANDIS MD; Attending Radiologist  This document has been electronically signed. Dec 27 2020 1:25AM

## 2020-12-27 NOTE — H&P ADULT - NSICDXPASTMEDICALHX_GEN_ALL_CORE_FT
PAST MEDICAL HISTORY:  Bilateral edema of lower extremity     Chronic kidney disease (CKD)     Diverticulitis     History of Clostridioides difficile infection     Rectal prolapse     Rectocele     Right femoral vein DVT Chronic    Uterine prolapse     UTI (urinary tract infection) E. coli

## 2020-12-27 NOTE — H&P ADULT - NSHPLABSRESULTS_GEN_ALL_CORE
Vital Signs Last 24 Hrs  T(C): 36.8 (26 Dec 2020 23:05), Max: 36.8 (26 Dec 2020 23:05)  T(F): 98.2 (26 Dec 2020 23:05), Max: 98.2 (26 Dec 2020 23:05)  HR: 80 (26 Dec 2020 23:05) (80 - 80)  BP: 102/56 (26 Dec 2020 23:05) (102/56 - 102/56)  BP(mean): --  RR: 18 (26 Dec 2020 23:05) (18 - 18)  SpO2: 97% (26 Dec 2020 23:05) (97% - 97%)        LABS:                        9.7    8.98  )-----------( 185      ( 27 Dec 2020 00:16 )             30.8     12-27    138  |  100  |  40.0<H>  ----------------------------<  131<H>  3.7   |  24.0  |  1.89<H>    Ca    8.3<L>      27 Dec 2020 00:16    TPro  6.4<L>  /  Alb  3.2<L>  /  TBili  2.2<H>  /  DBili  x   /  AST  472<H>  /  ALT  264<H>  /  AlkPhos  405<H>  12-27    PT/INR - ( 27 Dec 2020 00:16 )   PT: 13.0 sec;   INR: 1.13 ratio         PTT - ( 27 Dec 2020 00:16 )  PTT:26.5 sec      IMAGING RESULTS  RUQ US:  FINDINGS:  The liver is normal in size and contour. There is normal hepatic echotexture without discrete lesion. There is no intra or extrahepatic biliary dilatation. The common bile duct measures 5 mm. The gallbladder is again distended with multiple gallstones. Wall thickness is upper limits of normal at 2.5 mm. Trace free fluid is questioned adjacent to the gallbladder.    The pancreas is not well-visualized due to overlying bowel gas. Visualized portions are unremarkable. There is no ascites.    The right kidney measures 9.7 cm without hydronephrosis or shadowing calculus. Echogenic appearance to the parenchyma is unchanged as is a mid pole lateral cyst.    Atherosclerotic changes of the aorta. IVC within normal limits.    IMPRESSION:  No significant interval change. Distended gallbladder with gallstones and upper limited normal wall thickness. Questionable trace pericholecystic fluid. Correlation with nuclear medicine DISIDA scan is recommended      ALDAIR LANDIS MD; Attending Radiologist  This document has been electronically signed. Dec 27 2020 1:25AM

## 2020-12-28 ENCOUNTER — TRANSCRIPTION ENCOUNTER (OUTPATIENT)
Age: 85
End: 2020-12-28

## 2020-12-28 DIAGNOSIS — I10 ESSENTIAL (PRIMARY) HYPERTENSION: ICD-10-CM

## 2020-12-28 DIAGNOSIS — R94.4 ABNORMAL RESULTS OF KIDNEY FUNCTION STUDIES: ICD-10-CM

## 2020-12-28 DIAGNOSIS — K83.09 OTHER CHOLANGITIS: ICD-10-CM

## 2020-12-28 DIAGNOSIS — G93.41 METABOLIC ENCEPHALOPATHY: ICD-10-CM

## 2020-12-28 DIAGNOSIS — D64.9 ANEMIA, UNSPECIFIED: ICD-10-CM

## 2020-12-28 LAB
ALBUMIN SERPL ELPH-MCNC: 3 G/DL — LOW (ref 3.3–5.2)
ALP SERPL-CCNC: 265 U/L — HIGH (ref 40–120)
ALT FLD-CCNC: 132 U/L — HIGH
ANION GAP SERPL CALC-SCNC: 14 MMOL/L — SIGNIFICANT CHANGE UP (ref 5–17)
ANISOCYTOSIS BLD QL: SLIGHT — SIGNIFICANT CHANGE UP
AST SERPL-CCNC: 100 U/L — HIGH
BASOPHILS # BLD AUTO: 0 K/UL — SIGNIFICANT CHANGE UP (ref 0–0.2)
BASOPHILS NFR BLD AUTO: 0 % — SIGNIFICANT CHANGE UP (ref 0–2)
BILIRUB DIRECT SERPL-MCNC: 2 MG/DL — HIGH (ref 0–0.3)
BILIRUB INDIRECT FLD-MCNC: 0.4 MG/DL — SIGNIFICANT CHANGE UP (ref 0.2–1)
BILIRUB SERPL-MCNC: 2.3 MG/DL — HIGH (ref 0.4–2)
BUN SERPL-MCNC: 40 MG/DL — HIGH (ref 8–20)
BURR CELLS BLD QL SMEAR: PRESENT — SIGNIFICANT CHANGE UP
CALCIUM SERPL-MCNC: 8.2 MG/DL — LOW (ref 8.6–10.2)
CHLORIDE SERPL-SCNC: 102 MMOL/L — SIGNIFICANT CHANGE UP (ref 98–107)
CO2 SERPL-SCNC: 25 MMOL/L — SIGNIFICANT CHANGE UP (ref 22–29)
CREAT SERPL-MCNC: 2.08 MG/DL — HIGH (ref 0.5–1.3)
ELLIPTOCYTES BLD QL SMEAR: SLIGHT — SIGNIFICANT CHANGE UP
EOSINOPHIL # BLD AUTO: 0 K/UL — SIGNIFICANT CHANGE UP (ref 0–0.5)
EOSINOPHIL NFR BLD AUTO: 0 % — SIGNIFICANT CHANGE UP (ref 0–6)
GLUCOSE SERPL-MCNC: 123 MG/DL — HIGH (ref 70–99)
HCT VFR BLD CALC: 29.2 % — LOW (ref 34.5–45)
HGB BLD-MCNC: 9.1 G/DL — LOW (ref 11.5–15.5)
LIDOCAIN IGE QN: 23 U/L — SIGNIFICANT CHANGE UP (ref 22–51)
LYMPHOCYTES # BLD AUTO: 0.33 K/UL — LOW (ref 1–3.3)
LYMPHOCYTES # BLD AUTO: 3.5 % — LOW (ref 13–44)
MACROCYTES BLD QL: SLIGHT — SIGNIFICANT CHANGE UP
MAGNESIUM SERPL-MCNC: 2 MG/DL — SIGNIFICANT CHANGE UP (ref 1.6–2.6)
MANUAL SMEAR VERIFICATION: SIGNIFICANT CHANGE UP
MCHC RBC-ENTMCNC: 31.2 GM/DL — LOW (ref 32–36)
MCHC RBC-ENTMCNC: 31.5 PG — SIGNIFICANT CHANGE UP (ref 27–34)
MCV RBC AUTO: 101 FL — HIGH (ref 80–100)
MONOCYTES # BLD AUTO: 0.24 K/UL — SIGNIFICANT CHANGE UP (ref 0–0.9)
MONOCYTES NFR BLD AUTO: 2.6 % — SIGNIFICANT CHANGE UP (ref 2–14)
NEUTROPHILS # BLD AUTO: 8.6 K/UL — HIGH (ref 1.8–7.4)
NEUTROPHILS NFR BLD AUTO: 91.3 % — HIGH (ref 43–77)
OVALOCYTES BLD QL SMEAR: SLIGHT — SIGNIFICANT CHANGE UP
PHOSPHATE SERPL-MCNC: 3.7 MG/DL — SIGNIFICANT CHANGE UP (ref 2.4–4.7)
PLAT MORPH BLD: NORMAL — SIGNIFICANT CHANGE UP
PLATELET # BLD AUTO: 153 K/UL — SIGNIFICANT CHANGE UP (ref 150–400)
POIKILOCYTOSIS BLD QL AUTO: SLIGHT — SIGNIFICANT CHANGE UP
POLYCHROMASIA BLD QL SMEAR: SLIGHT — SIGNIFICANT CHANGE UP
POTASSIUM SERPL-MCNC: 3.8 MMOL/L — SIGNIFICANT CHANGE UP (ref 3.5–5.3)
POTASSIUM SERPL-SCNC: 3.8 MMOL/L — SIGNIFICANT CHANGE UP (ref 3.5–5.3)
PROT SERPL-MCNC: 5.9 G/DL — LOW (ref 6.6–8.7)
RBC # BLD: 2.89 M/UL — LOW (ref 3.8–5.2)
RBC # FLD: 13.8 % — SIGNIFICANT CHANGE UP (ref 10.3–14.5)
RBC BLD AUTO: ABNORMAL
SODIUM SERPL-SCNC: 141 MMOL/L — SIGNIFICANT CHANGE UP (ref 135–145)
VARIANT LYMPHS # BLD: 2.6 % — SIGNIFICANT CHANGE UP (ref 0–6)
WBC # BLD: 9.42 K/UL — SIGNIFICANT CHANGE UP (ref 3.8–10.5)
WBC # FLD AUTO: 9.42 K/UL — SIGNIFICANT CHANGE UP (ref 3.8–10.5)

## 2020-12-28 PROCEDURE — 99223 1ST HOSP IP/OBS HIGH 75: CPT

## 2020-12-28 PROCEDURE — 93306 TTE W/DOPPLER COMPLETE: CPT | Mod: 26

## 2020-12-28 PROCEDURE — 78226 HEPATOBILIARY SYSTEM IMAGING: CPT | Mod: 26

## 2020-12-28 PROCEDURE — 99233 SBSQ HOSP IP/OBS HIGH 50: CPT

## 2020-12-28 PROCEDURE — 99232 SBSQ HOSP IP/OBS MODERATE 35: CPT

## 2020-12-28 RX ORDER — AMLODIPINE BESYLATE 2.5 MG/1
1 TABLET ORAL
Qty: 0 | Refills: 0 | DISCHARGE
Start: 2020-12-28

## 2020-12-28 RX ORDER — SACCHAROMYCES BOULARDII 250 MG
1 POWDER IN PACKET (EA) ORAL
Qty: 0 | Refills: 0 | DISCHARGE
Start: 2020-12-28

## 2020-12-28 RX ORDER — DIAZEPAM 5 MG
0.5 TABLET ORAL ONCE
Refills: 0 | Status: DISCONTINUED | OUTPATIENT
Start: 2020-12-28 | End: 2020-12-28

## 2020-12-28 RX ORDER — PIPERACILLIN AND TAZOBACTAM 4; .5 G/20ML; G/20ML
3.38 INJECTION, POWDER, LYOPHILIZED, FOR SOLUTION INTRAVENOUS
Qty: 0 | Refills: 0 | DISCHARGE
Start: 2020-12-28

## 2020-12-28 RX ORDER — HEPARIN SODIUM 5000 [USP'U]/ML
5000 INJECTION INTRAVENOUS; SUBCUTANEOUS EVERY 12 HOURS
Refills: 0 | Status: DISCONTINUED | OUTPATIENT
Start: 2020-12-28 | End: 2020-12-29

## 2020-12-28 RX ORDER — MORPHINE SULFATE 50 MG/1
2 CAPSULE, EXTENDED RELEASE ORAL ONCE
Refills: 0 | Status: DISCONTINUED | OUTPATIENT
Start: 2020-12-28 | End: 2020-12-28

## 2020-12-28 RX ORDER — LANOLIN ALCOHOL/MO/W.PET/CERES
1 CREAM (GRAM) TOPICAL
Qty: 0 | Refills: 0 | DISCHARGE
Start: 2020-12-28

## 2020-12-28 RX ORDER — POTASSIUM CHLORIDE 20 MEQ
10 PACKET (EA) ORAL
Refills: 0 | Status: COMPLETED | OUTPATIENT
Start: 2020-12-28 | End: 2020-12-28

## 2020-12-28 RX ADMIN — Medication 1 MILLIGRAM(S): at 00:01

## 2020-12-28 RX ADMIN — Medication 20 MILLIGRAM(S): at 05:42

## 2020-12-28 RX ADMIN — Medication 250 MILLIGRAM(S): at 05:42

## 2020-12-28 RX ADMIN — PIPERACILLIN AND TAZOBACTAM 25 GRAM(S): 4; .5 INJECTION, POWDER, LYOPHILIZED, FOR SOLUTION INTRAVENOUS at 05:42

## 2020-12-28 RX ADMIN — Medication 250 MILLIGRAM(S): at 17:23

## 2020-12-28 RX ADMIN — Medication 0.25 MILLIGRAM(S): at 12:18

## 2020-12-28 RX ADMIN — Medication 25 MILLIGRAM(S): at 17:23

## 2020-12-28 RX ADMIN — LATANOPROST 1 DROP(S): 0.05 SOLUTION/ DROPS OPHTHALMIC; TOPICAL at 22:20

## 2020-12-28 RX ADMIN — Medication 0.5 MILLIGRAM(S): at 09:15

## 2020-12-28 RX ADMIN — MORPHINE SULFATE 2 MILLIGRAM(S): 50 CAPSULE, EXTENDED RELEASE ORAL at 13:28

## 2020-12-28 RX ADMIN — Medication 100 MILLIEQUIVALENT(S): at 16:03

## 2020-12-28 RX ADMIN — HEPARIN SODIUM 5000 UNIT(S): 5000 INJECTION INTRAVENOUS; SUBCUTANEOUS at 17:23

## 2020-12-28 RX ADMIN — Medication 100 MILLIEQUIVALENT(S): at 18:52

## 2020-12-28 RX ADMIN — Medication 25 MILLIGRAM(S): at 05:42

## 2020-12-28 RX ADMIN — PIPERACILLIN AND TAZOBACTAM 25 GRAM(S): 4; .5 INJECTION, POWDER, LYOPHILIZED, FOR SOLUTION INTRAVENOUS at 17:28

## 2020-12-28 RX ADMIN — Medication 1 MILLIGRAM(S): at 22:19

## 2020-12-28 RX ADMIN — AMLODIPINE BESYLATE 5 MILLIGRAM(S): 2.5 TABLET ORAL at 05:42

## 2020-12-28 NOTE — DISCHARGE NOTE PROVIDER - NSDCCPCAREPLAN_GEN_ALL_CORE_FT
PRINCIPAL DISCHARGE DIAGNOSIS  Diagnosis: Acute cholecystitis  Assessment and Plan of Treatment: [place] [facility] [place].

## 2020-12-28 NOTE — CHART NOTE - NSCHARTNOTEFT_GEN_A_CORE
In light of MRCP results not showing choledocholithiasis, will not plan on ERCP tomorrow.  Will continue to follow.
Telephone contact    Called patient's daughter Cecilia on . Patient's daughter express that given that her mother is not going to have ERCP and that the plan is cholecystectomy she would like her mother to be [place] [facility]  This decision is because the daughter lives [place] away from [facility] and all the Patient's doctors and care has been there, and would like to remain that way. Cecilia states that she's not able to drive from [place] to [facility] in case anything happens.     Patient's daughter also states that her mother takes all her medical decisions, although it is always discussed with the family, and would like to be informed on what are the next steps and if her mother can be transferred.    It was explained that if the patient can be transferred, that will be addressed tomorrow morning with the day team, pending discussion of surgical options.
extensive conversation had with daughter Cecilia over the phone regarding the patient's clinical status  patient was noted to be walking around the hallways, disoriented and asking for " her daughter and to call the police"    patient was given melatonin and escorted back to her room. 1:1 ordered for safety    long discussion held with daughter regarding the plan that has been unchanged since the first phone call made today to update. no ercp today given the mrcp findings of no obvious choledocholithiasis. plan for trend liver function tests in AM to determine the next course of action. GI will continue to follow along    answered all of the questions that the daughter had regarding the usage of the telephone in the patient's room, as well as the expectation to receive an update about the plan by the physician team daily.   daughter expressed concern for her mother being treated differently given her increased age. reassured daughter that the age of the patient would not affect her management during the inpatient course.     updated daughter that the cardiologist evaluated the patient for risk stratification and added amlodipine for control of BP    at this time, daughter is understanding of the plan and will not call the patient's room in order to allow for better sleep hygiene.
Notified by nursing staff that patient is combative and getting out of bed.   Patient is able to be redirected, oriented to place; not a harm to herself or others.   She is now in bed and will be given 1mg melatonin,

## 2020-12-28 NOTE — CONSULT NOTE ADULT - ASSESSMENT
89y Female with PMH of HTN, CKD, and diverticulitis presents as transfer from [facility] [place] for concerns of cholangitis and need for ERCP. Patient reports not having any pain prior to presentation. Daughter felt she appeared more weak and tired, which patient corroborates. Her appetite has also been decreased, but does not have to do with pain.  At [facility] patient had TMax of 102.2F, leukocytosis, and abnormally elevate LFT's. Concern was for cholangitis and patient transferred for need of ERCP. U/S noted with gallstones , negative MRCP , patient confused / agitated , on 1:1 for safety NPO, ivf , iv abx . GI is following , no need for ERCP . Planned for OR in am . Seen by cardio , TTE is pending . Noted with BP on higher side , Amlodipine added .

## 2020-12-28 NOTE — CONSULT NOTE ADULT - PROBLEM SELECTOR RECOMMENDATION 9
/ biliary pancreatitis - as above - keep NPO , IVF , IV Abx , GI / surgery following ,   no need for ERCP at this time , planned for OR in am

## 2020-12-28 NOTE — CONSULT NOTE ADULT - PROBLEM SELECTOR RECOMMENDATION 3
- patient with confusion / agitation - likely due to acute illness , send UA to r/o UTI , may not be accurate as patient already on iv abx

## 2020-12-28 NOTE — DISCHARGE NOTE PROVIDER - HOSPITAL COURSE
HPI:  89y Female with PMH of HTN, CKD, and diverticulitis presents as transfer from [facility] for concerns of cholangitis and need for ERCP. Patient reports not having any pain prior to presentation. Daughter felt she appeared more weak and tired, which patient corroborates. Her appetite has also been decreased, but does not have to do with pain. Patient denied nausea, vomiting subjective fevers, chills, chest pain, or trouble breathing. Patient expressed that she is "very healthy." Bowel movements and urination have been normal for her. Occasional bloody streaks on toilet paper, but patient with known rectal prolapse history. At [facility] patient had TMax of 102.2F, leukocytosis, and abnormally elevate LFT's. Concern was for cholangitis and patient transferred for need of ERCP. She is non-toxic appearing, clinically and hemodynamically stable. Of note, daughter Cecilia ([phone]) denies any cardiac or pulmonary history.      Hospital Course:  Patient was admitted to the surgery service and was evaluated by GI recommending MRCP and HIDA scan due to undilated biliary duct. HIDA was + and MRCP was negative. Given this finding GI stated that the patient does not require ERCP and would benefit from cholecystectomy. During hospital stay patient's LFT improved, WBC was normal, and pain was under control. Patient remained on IV abx and BCx were positive for GNR. Patient was evaluated by cardiology and IM without absolute contraindication for cholecystectomy, with TTE LVEF 55-60%.  Given no need for ERCP, and request from daughter that the patient be transferred to [facility] for laparoscopic cholecystectomy, the patient will be transferred to continue care there. 89y Female with PMH of HTN, CKD, and diverticulitis presents as transfer from [facility] for concerns of cholangitis and need for ERCP. Patient reports not having any pain prior to presentation. Daughter felt she appeared more weak and tired, which patient corroborates. Her appetite has also been decreased, but does not have to do with pain. Patient denied nausea, vomiting subjective fevers, chills, chest pain, or trouble breathing. Patient expressed that she is "very healthy." Bowel movements and urination have been normal for her. Occasional bloody streaks on toilet paper, but patient with known rectal prolapse history. At [facility] patient had TMax of 102.2F, leukocytosis, and abnormally elevate LFT's. Concern was for cholangitis and patient transferred for need of ERCP. She is non-toxic appearing, clinically and hemodynamically stable. Of note, daughter Cecilia ([phone]) denies any cardiac or pulmonary history.      Hospital Course:  Patient was admitted to the surgery service and was evaluated by GI recommending MRCP and HIDA scan due to undilated biliary duct. MRCP was performed on 12/27 which showed: examination limited secondary to respiratory motion. Hydropic gallbladder with gallstones and mural thickening. Suggest further evaluation with cholescintigraphy. No biliary dilatation. No definite choledocholithiasis. Diffusion restriction of the liver raising the possibility of intrinsic liver disease. A HIDA was performed on 12/28 which showed an Abnormal morphine-augmented hepatobiliary scan with findings consistent with acute cholecystitis.  Given this finding GI stated that the patient does not require ERCP and would benefit from cholecystectomy. During hospital stay patient's LFT improved, WBC was normal, and pain was under control. Patient remained on IV abx since BCx were positive for GNR. Patient was evaluated by cardiology and IM without absolute contraindication for cholecystectomy, with TTE LVEF 55-60%.  Given no need for ERCP, and request from daughter that the patient be transferred to [facility] for laparoscopic cholecystectomy, the patient will be transferred to continue care there.     TTE Results:  Summary:  1. Left ventricular ejection fraction, by visual estimation, is 55 to 60%.  2. Normal global left ventricular systolic function.  3. Spectral Doppler shows impaired relaxation pattern of left ventricular myocardial filling (Grade I diastolic dysfunction).  4. There is no evidence of pericardial effusion.  5. Mild mitral valve regurgitation.  6. Mild thickening and calcification of the anterior and posterior mitral valve leaflets.  7. Mild to moderate mitral annular calcification.  8. Moderate tricuspid regurgitation.  9. Sclerotic aortic valve with normal opening.  10. Mild pulmonic valve regurgitation.  11. Estimated pulmonary artery systolic pressure is 35.3 mmHg assuming a right atrial pressure of 3 mmHg, which is consistent with borderline pulmonary hypertension.  12. There are no prior studies on this patient for comparison purposes.   89y Female with PMH of HTN, CKD, and diverticulitis presents as transfer from [facility] for concerns of cholangitis and need for ERCP. Patient reports not having any pain prior to presentation. Daughter felt she appeared more weak and tired, which patient corroborates. Her appetite has also been decreased, but does not have to do with pain. Patient denied nausea, vomiting subjective fevers, chills, chest pain, or trouble breathing. Patient expressed that she is "very healthy." Bowel movements and urination have been normal for her. Occasional bloody streaks on toilet paper, but patient with known rectal prolapse history. At [facility] patient had TMax of 102.2F, leukocytosis, and abnormally elevate LFT's. Concern was for cholangitis and patient transferred for need of ERCP. She is non-toxic appearing, clinically and hemodynamically stable. Of note, daughter Cecilia ([phone]) denies any cardiac or pulmonary history.      Hospital Course:  Patient was admitted to the surgery service and was evaluated by GI recommending MRCP and HIDA scan due to undilated biliary duct. MRCP was performed on 12/27 which showed: examination limited secondary to respiratory motion. Hydropic gallbladder with gallstones and mural thickening. Suggest further evaluation with cholescintigraphy. No biliary dilatation. No definite choledocholithiasis. Diffusion restriction of the liver raising the possibility of intrinsic liver disease. A HIDA was performed on 12/28 which showed an Abnormal morphine-augmented hepatobiliary scan with findings consistent with acute cholecystitis.  Given this finding GI stated that the patient does not require ERCP and would benefit from cholecystectomy. During hospital stay patient's LFT improved, WBC was normal, and pain was under control. Patient remained on IV abx (zosyn) since BCx were positive for GNR. Patient was evaluated by cardiology and IM without absolute contraindication for cholecystectomy, with TTE LVEF 55-60%.  Given no need for ERCP, and request from daughter that the patient be transferred to [facility] for laparoscopic cholecystectomy, the patient will be transferred to continue care there.     TTE Results:  Summary:  1. Left ventricular ejection fraction, by visual estimation, is 55 to 60%.  2. Normal global left ventricular systolic function.  3. Spectral Doppler shows impaired relaxation pattern of left ventricular myocardial filling (Grade I diastolic dysfunction).  4. There is no evidence of pericardial effusion.  5. Mild mitral valve regurgitation.  6. Mild thickening and calcification of the anterior and posterior mitral valve leaflets.  7. Mild to moderate mitral annular calcification.  8. Moderate tricuspid regurgitation.  9. Sclerotic aortic valve with normal opening.  10. Mild pulmonic valve regurgitation.  11. Estimated pulmonary artery systolic pressure is 35.3 mmHg assuming a right atrial pressure of 3 mmHg, which is consistent with borderline pulmonary hypertension.  12. There are no prior studies on this patient for comparison purposes.

## 2020-12-28 NOTE — PROGRESS NOTE ADULT - ASSESSMENT
A/P: 89y Female with PMH of HTN, CKD, and diverticulitis [place] [facility] [place] and need for ERCP. Patient reports not having any pain prior to presentation. Daughter felt she appeared more weak and tired, which patient corroborates. Her appetite has also been decreased, but does not have to do with pain. Patient denies nausea, vomiting subjective fevers, chills, chest pain, or trouble breathing. Patient expresses she is "very healthy." Bowel movements and urination have been normal for her. Occasional bloody streaks on toilet paper, but patient with known rectal prolapse history. [place] [facility] [place] TMax of 102.2F, leukocytosis, and abnormally elevate LFT's. Concern was for cholangitis and patient transferred for need of ERCP. She is non-toxic appearing, clinically and hemodynamically stable. Of note, daughter Cecilia ([phone]) denies any cardiac or pulmonary history.  (27 Dec 2020 02:16)    Above Appreciated  Cardiology consult requested for cardiac risk stratification for ERCP. Pt states her daughter thought she did not look well and [place] [facility] [place] was found to have cholangitis. Pt was transferred to [facility] for further evaluation. Pt denies chest pain, palpitations, SOB, fevers, chills, cough, n/v/d, abd pain. Pt states she is able to ambulate with walker without eliciting anginal symptoms. Pt has no recent cardiac w/u.       Cardiac Risk Stratification  -RCRI Risk score-0 Class II Risk, 3.9% risk of major cardiac event  - ECG-NSR HR @ 84  -Echo-ordered and pending  - Pending Echo results. No active chest pain, evidence of ischemia, decompensated CHF, significant valvular abnormality, or unstable arrhythmia and therefore no absolute cardiac contraindication to the planned surgical procedure.     HTN  - BP better controlled today.   -Pt s/p plasmalyte infusion  -Cont current antihypertensive medication regimen   - monitor BP     Preliminary evaluation, please await official recommendations by Dr. De La Cruz   A/P: 89y Female with PMH of HTN, CKD, and diverticulitis presents as transfer from [facility] [place] for concerns of cholangitis and need for ERCP. Patient reports not having any pain prior to presentation. Daughter felt she appeared more weak and tired, which patient corroborates. Her appetite has also been decreased, but does not have to do with pain. Patient denies nausea, vomiting subjective fevers, chills, chest pain, or trouble breathing. Patient expresses she is "very healthy." Bowel movements and urination have been normal for her. Occasional bloody streaks on toilet paper, but patient with known rectal prolapse history. At [facility] [place] patient had TMax of 102.2F, leukocytosis, and abnormally elevate LFT's. Concern was for cholangitis and patient transferred for need of ERCP. She is non-toxic appearing, clinically and hemodynamically stable. Of note, daughter Cecilia ([phone]) denies any cardiac or pulmonary history.  (27 Dec 2020 02:16)    Above Appreciated  Cardiology consult requested for cardiac risk stratification for ERCP. Pt states her daughter thought she did not look well and brought her to [facility] [place] where she was found to have cholangitis. Pt was transferred to [facility] for further evaluation. Pt denies chest pain, palpitations, SOB, fevers, chills, cough, n/v/d, abd pain. Pt states she is able to ambulate with walker without eliciting anginal symptoms. Pt has no recent cardiac w/u.       Cardiac Risk Stratification  -RCRI Risk score-1 (Cr 2.08) Class II Risk, 6.0% risk of major cardiac event  - ECG-NSR HR @ 84  -Echo-ordered and pending  - Pending Echo results. No active chest pain, evidence of ischemia, decompensated CHF, significant valvular abnormality, or unstable arrhythmia and therefore no absolute cardiac contraindication to the planned surgical procedure.     HTN  - BP better controlled today.   -Pt s/p plasmalyte infusion  -Cont current antihypertensive medication regimen   - monitor BP     Preliminary evaluation, please await official recommendations by Dr. De La Cruz

## 2020-12-28 NOTE — DISCHARGE NOTE PROVIDER - NSDCMRMEDTOKEN_GEN_ALL_CORE_FT
amLODIPine 5 mg oral tablet: 1 tab(s) orally once a day  melatonin 1 mg oral tablet: 1 tab(s) orally once a day (at bedtime)  metaproterenol:   Metoprolol Tartrate 50 mg oral tablet: 0.5 tab(s) orally 2 times a day  piperacillin-tazobactam: 3.375 milligram(s) intravenous every 12 hours  saccharomyces boulardii lyo 250 mg oral capsule: 1 cap(s) orally 2 times a day  torsemide 20 mg oral tablet: 1 tab(s) orally once a day  Xalatan 0.005% ophthalmic solution: 1 drop(s) to each affected eye once a day (in the evening)

## 2020-12-28 NOTE — PROGRESS NOTE ADULT - ATTENDING COMMENTS
Patient was seen and examined and more agitated today with a 1:1   Patient not on a monitor but notes no chest pain or shortness of breath   MRCP: Hydropic gallbladder with gallstones and mural thickening. Suggest further evaluation with cholescintigraphy. No biliary dilatation. No definite choledocholithiasis. Diffusion restriction of the liver raising the possibility of intrinsic liver disease  Awaiting echocardiogram     Dx: Preoperative cardiovascular assessment prior to ERCP   - patient denies any chest pain or shortness of breath   - patient has -RCRI Risk score of 1 (Cr 2.08) Class II Risk, 6.0% risk of major cardiac event and thus considered elevated risk for a low risk procedure (ERCP); if going to cholecystectomy is considered elevated risk for an intermediate risk procedure   - Pending Echo results. No active chest pain, evidence of ischemia, decompensated CHF, significant valvular abnormality, or unstable arrhythmia and therefore no absolute cardiac contraindication to the planned surgical procedure.   - blood pressure is better controlled   - FRANSISCA on CKD vs FRANSISCA - encourage oral hydration or IV hydration at this time     if echocardiogram shows no wall motion abnormalities patient may proceed with risk stratification as described as above, will sign off     Thank You   Marisol De L aCruz D.O.  Cardiology

## 2020-12-28 NOTE — PROGRESS NOTE ADULT - ASSESSMENT
Biliary pancreatitis/ GB stones/negative MRCP: At this time, no need for ERCP. IV fluids. IV antibiotics. Cholecystectomy as per surgical team. As patient is agitated, Cholecystotomy tube placement can be challenging.  Medical optimization.

## 2020-12-28 NOTE — CONSULT NOTE ADULT - PROBLEM SELECTOR RECOMMENDATION 4
- patient with Hx of CKD , not sure what stage , now Cr - 2.08- likely CKD ,vs FRANSISCA on chronic - continue ivf , avoid nephrotoxic meds , zosyn dose adjusted renally . Follow BMP daily

## 2020-12-28 NOTE — CONSULT NOTE ADULT - SUBJECTIVE AND OBJECTIVE BOX
Patient seen and examined , confused , agitated as per staff , on 1:1 for safety .     CC[place] [facility] [place]        HPI:    89y Female with PMH of HTN, CKD, and diverticulitis presents [place] [facility] [place] and need for ERCP. Patient reports not having any pain prior to presentation. Daughter felt she appeared more weak and tired, which patient corroborates. Her appetite has also been decreased, but does not have to do with pain. Patient denies nausea, vomiting subjective fevers, chills, chest pain, or trouble breathing. Patient expresses she is "very healthy." Bowel movements and urination have been normal for her. Occasional bloody streaks on toilet paper, but patient with known rectal prolapse history. [place] [facility] [place] TMax of 102.2F, leukocytosis, and abnormally elevate LFT's. Concern was for cholangitis and patient transferred for need of ERCP. She is non-toxic appearing, clinically and hemodynamically stable. Of note, daughter Cecilia ([phone]) denies any cardiac or pulmonary history.  (27 Dec 2020 02:16)    Above noted and appreciated , not able to obtain any history from patient due to confusion . Information obtained from   chart       PAST MEDICAL & SURGICAL HISTORY:  Bilateral edema of lower extremity    Rectocele    History of C. difficile infection    Right femoral vein DVT  Chronic    Uterine prolapse    Rectal prolapse    Diverticulitis    Chronic kidney disease (CKD)    UTI (urinary tract infection)  E. coli    Cataract, right eye    H/O left wrist surgery        Social History:  UTO due to confusion     FAMILY HISTORY:  UTO due to confusion     Allergies    No Known Allergies    Intolerances        HOME MEDICATIONS :   metoprolol tartrate 25 milliGRAM(s) Oral two times a day  torsemide 20 milliGRAM(s) Oral daily  latanoprost 0.005% Ophthalmic Solution     REVIEW OF SYSTEMS:    Patient is confused , agitated , UTO     MEDICATIONS  (STANDING):  amLODIPine   Tablet 5 milliGRAM(s) Oral daily  latanoprost 0.005% Ophthalmic Solution 1 Drop(s) Both EYES at bedtime  melatonin 1 milliGRAM(s) Oral at bedtime  metoprolol tartrate 25 milliGRAM(s) Oral two times a day  multiple electrolytes Injection Type 1 1000 milliLiter(s) (100 mL/Hr) IV Continuous <Continuous>  piperacillin/tazobactam IVPB.. 3.375 Gram(s) IV Intermittent every 12 hours  potassium chloride  10 mEq/100 mL IVPB 10 milliEquivalent(s) IV Intermittent every 1 hour  saccharomyces boulardii 250 milliGRAM(s) Oral two times a day  torsemide 20 milliGRAM(s) Oral daily    MEDICATIONS  (PRN):      Vital Signs Last 24 Hrs  T(C): 36.3 (28 Dec 2020 08:16), Max: 36.9 (27 Dec 2020 16:01)  T(F): 97.4 (28 Dec 2020 08:16), Max: 98.4 (27 Dec 2020 16:01)  HR: 80 (28 Dec 2020 13:29) (80 - 102)  BP: 150/70 (28 Dec 2020 13:29) (97/52 - 181/78)  BP(mean): --  RR: 16 (28 Dec 2020 13:29) (16 - 18)  SpO2: 96% (28 Dec 2020 08:16) (96% - 100%)    PHYSICAL EXAM:    GENERAL: NAD, well-groomed, well-developed  HEAD:  Atraumatic, Normocephalic  EYES: EOMI, PERRLA, conjunctiva and sclera clear  NECK: Supple, No JVD, Normal thyroid  NERVOUS SYSTEM: confused , agitated , repeating " call police " , unable to assess   CHEST/LUNG: CTA  b/l,  no rales, rhonchi, wheezing, or rubs  HEART: Regular rate and rhythm; No murmurs, rubs, or gallops  ABDOMEN: Soft, Nontender, Nondistended; Bowel sounds present  EXTREMITIES:  2+ Peripheral Pulses, No clubbing, cyanosis, or edema ,   LYMPH: No lymphadenopathy noted  SKIN: No rashes or lesions    LABS:                        9.1    9.42  )-----------( 153      ( 28 Dec 2020 06:35 )             29.2     12-28    141  |  102  |  40.0<H>  ----------------------------<  123<H>  3.8   |  25.0  |  2.08<H>    Ca    8.2<L>      28 Dec 2020 06:35  Phos  3.7     12-28  Mg     2.0     12-28    TPro  5.9<L>  /  Alb  3.0<L>  /  TBili  2.3<H>  /  DBili  2.0<H>  /  AST  100<H>  /  ALT  132<H>  /  AlkPhos  265<H>  12-28    PT/INR - ( 27 Dec 2020 00:16 )   PT: 13.0 sec;   INR: 1.13 ratio         PTT - ( 27 Dec 2020 00:16 )  PTT:26.5 sec    Lipase, Serum: 23 U/L (12.28.20 @ 06:35)    Lipase, Serum (12.27.20 @ 08:10)    Lipase, Serum: 312 U/L    Lipase, Serum: 995 U/L (12.27.20 @ 00:16)      COVID-19 IgG Antibody Index: <0.10: Abbott CMIA  Negative Result    <= 1.39 Index  Positive Result      >= 1.40 Index Index (12.27.20 @ 11:17)    COVID-19 PCR: NotDetec: You can help in the fight against COVID-19. [employer] may contact  you to see if you are interested in voluntarily participating in one of  our clinical trials.  Testing is performed using polymerase chain reaction (PCR) or  transcription mediated amplification (TMA). This COVID-19 (SARS-CoV-2)  nucleic acid amplification test was validated by [employer] and is  in use under the FDA Emergency Use Authorization (EUA) for clinical labs  CLIA-certified to perform high complexity testing. Test results should be  correlated with clinical presentation, patient history, and epidemiology. (12.27.20 @ 04:29)            RADIOLOGY & ADDITIONAL STUDIES:    < from: Xray Chest 1 View AP/PA. (12.26.20 @ 23:55) >     EXAM:  XR CHEST AP OR PA 1V                          PROCEDURE DATE:  12/26/2020          INTERPRETATION:  Portable chest radiograph    CLINICAL INFORMATION: Preoperative chest radiograph    TECHNIQUE:  Portable  AP view of the chest was obtained.    COMPARISON: 12/26/2020 chest available for review.    FINDINGS: There is a LEFT lower lobe retrocardiac airspace consolidation and/or mass. Confirmatory lateral radiograph or CT recommended.  Remaining lung parenchyma clear. Is mamillation of the RIGHT hemidiaphragm.    Cardiac chambers normal size. No hilar mass.    Visualized osseous structures are intact.    IMPRESSION:   [. LEFT retrocardiac airspace consolidation and/or mass. Confirmatory lateral radiograph or CT recommended.    DANIKA KENYON MD; Attending Radiologist  This document has been electronically signed. Dec 27 2020 10:51AM    < end of copied text >    < from: US Gallbladder (US Gallbladder .) (12.27.20 @ 00:56) >     EXAM:  US GALLBLADDER                          PROCEDURE DATE:  12/27/2020          INTERPRETATION:  HISTORY: New onset jaundice.    TECHNIQUE: Right upper quadrant abdominal sonogram was performed.    COMPARISON: Right upper quadrant sonogram 12/26/2020    FINDINGS:  The liver is normal in size and contour. There is normal hepatic echotexture without discrete lesion. There is no intra or extrahepatic biliary dilatation. The common bile duct measures 5 mm. The gallbladder is again distended with multiple gallstones. Wall thickness is upper limits of normal at 2.5 mm. Trace free fluid is questioned adjacent to the gallbladder.    The pancreas is not well-visualized due to overlying bowel gas. Visualized portions are unremarkable. There is no ascites.    The right kidney measures 9.7 cm without hydronephrosis or shadowing calculus. Echogenic appearance to the parenchyma is unchanged as is a mid pole lateral cyst.    Atherosclerotic changes of the aorta. IVC within normal limits.    IMPRESSION:  No significant interval change. Distended gallbladder with gallstones and upper limited normal wall thickness. Questionable trace pericholecystic fluid. Correlation with nuclear medicine DISIDA scan is recommended    ALDAIR LANDIS MD; Attending Radiologist  This document has been electronically signed. Dec 27 2020  1:25AM    < end of copied text >    < from: MR MRCP w/wo IV Cont (12.27.20 @ 15:23) >     EXAM:  MR MRCP WAW IC                          PROCEDURE DATE:  12/27/2020          INTERPRETATION:  CLINICAL INFORMATION: 89-year-old female with history of fever, leukocytosis and increased LFTs.    COMPARISON: Ultrasound 12/27/2020    PROCEDURE:  MRI of the abdomen was performed with and without intravenous contrast.  IV Contrast: Gadavist. 5 cc administered, 2.5 cc discarded.    Image quality is degraded by respiratory motion.      FINDINGS:  LOWER CHEST: Small bilateral pleural effusions    LIVER: Diffuse diffusion restriction raising the possibility of intrinsic liver disease.  BILE DUCTS: Normal caliber. No definite choledocholithiasis.  GALLBLADDER: Hydropic gallbladder with gallstones including stones at the neck of the gallbladder. Mural thickening and irregularity at the gallbladder fundus  SPLEEN: Within normal limits.  PANCREAS: Within normal limits.  ADRENALS: Within normal limits.  KIDNEYS/URETERS: Left renal cyst. No hydronephrosis.    VISUALIZED PORTIONS:  BOWEL: Within normal limits.  PERITONEUM: No ascites.  VESSELS: Within normal limits.  RETROPERITONEUM/LYMPH NODES: No lymphadenopathy.  ABDOMINAL WALL: Within normal limits.  BONES: Within normal limits.    IMPRESSION:  Examination limited secondary to respiratory motion.  Hydropic gallbladder with gallstones and mural thickening. Suggest further evaluation with cholescintigraphy.  No biliary dilatation. No definite choledocholithiasis.  Diffusion restriction of the liver raising the possibility of intrinsicliver disease      ARMANDO BETTS MD; Attending Radiologist  This document has been electronically signed. Dec 27 2020  4:14PM    < end of copied text >    < from: 12 Lead ECG (12.27.20 @ 09:33) >    Ventricular Rate 84 BPM    Atrial Rate 84 BPM    P-R Interval 182 ms    QRS Duration 84 ms    Q-T Interval 378 ms    QTC Calculation(Bazett) 446 ms    P Axis 75 degrees    R Axis 74 degrees    T Axis 63 degrees    Diagnosis Line *** Poor data quality, interpretation may be adversely affected  Normal sinus rhythm  Normal ECG    Confirmed by Deshaun Mitchell ([id number]) on 12/28/2020 12:20:30 AM    < end of copied text >

## 2020-12-28 NOTE — CONSULT NOTE ADULT - CONSULT REASON
R/o Cholangitis
Cardiac Risk Stratification
BACTEREMIA
Choledocholithiasis
Medical clearance requested for   planned cholecystectomy

## 2020-12-28 NOTE — CONSULT NOTE ADULT - SUBJECTIVE AND OBJECTIVE BOX
INFECTIOUS DISEASES AND INTERNAL MEDICINE at [place]  =======================================================  Castro Mathis MD  Diplomates American Board of Internal Medicine and Infectious Diseases  Telephone [phone]  Fax            [phone]  =======================================================    GRIFFIN [id number]      HPI:  89y Female with PMH of HTN, CKD, and diverticulitis presents as transfer from [facility] [place] for concerns of cholangitis and need for ERCP. Patient reports not having any pain prior to presentation. Daughter felt she appeared more weak and tired, which patient corroborates. Her appetite has also been decreased, but does not have to do with pain. Patient denies nausea, vomiting subjective fevers, chills, chest pain, or trouble breathing. Patient expresses she is "very healthy." Bowel movements and urination have been normal for her. Occasional bloody streaks on toilet paper, but patient with known rectal prolapse history. At [facility] [place] patient had TMax of 102.2F, leukocytosis, and abnormally elevate LFT's. Concern was for cholangitis and patient transferred for need of ERCP. She is non-toxic appearing, clinically and hemodynamically stable.    PT WITH POSITVE BLOOD CX GM NEG RODS PRELIM ECOLI  ASKED TO EVALUATE FROM ID STANDPOINT       PAST MEDICAL & SURGICAL HISTORY:  Bilateral edema of lower extremity    Rectocele    History of C  difficile infection    Right femoral vein DVT  Chronic    Uterine prolapse    Rectal prolapse    Diverticulitis    Chronic kidney disease (CKD)    UTI (urinary tract infection)  E. coli    Cataract, right eye    H/O left wrist surgery        ANTIBIOTICS  piperacillin/tazobactam IVPB.. 3.375 Gram(s) IV Intermittent every 12 hours      Allergies    No Known Allergies    Intolerances        SOCIAL HISTORY:     FAMILY HX   FAMILY HISTORY:      Vital Signs Last 24 Hrs  T(C): 36.6 (28 Dec 2020 15:31), Max: 36.9 (27 Dec 2020 19:35)  T(F): 97.9 (28 Dec 2020 15:31), Max: 98.4 (27 Dec 2020 19:35)  HR: 69 (28 Dec 2020 15:31) (69 - 102)  BP: 159/55 (28 Dec 2020 15:31) (97/52 - 159/55)  BP(mean): --  RR: 16 (28 Dec 2020 15:31) (16 - 18)  SpO2: 97% (28 Dec 2020 15:31) (96% - 100%)  Drug Dosing Weight    Weight (kg): 59 (26 Dec 2020 23:05)      REVIEW OF SYSTEMS:    CONSTITUTIONAL:  As per HPI.    HEENT:  Eyes:  No diplopia or blurred vision. ENT:  No earache, sore throat or runny nose.    CARDIOVASCULAR:  No pressure, squeezing, strangling, tightness, heaviness or aching about the chest, neck, axilla or epigastrium.    RESPIRATORY:  No cough, shortness of breath, PND or orthopnea.    GASTROINTESTINAL:  No nausea, vomiting or diarrhea.    GENITOURINARY:  No dysuria, frequency or urgency.    MUSCULOSKELETAL:  As per HPI.    SKIN:  No change in skin, hair or nails.    NEUROLOGIC:  No paresthesias, fasciculations, seizures or weakness.                  PHYSICAL EXAMINATION:    GENERAL: The patient is a well-developed, well-nourished _____in no apparent distress. ___ is alert and oriented x3.    VITAL SIGNS: T(C): 36.6 (12-28-20 @ 15:31), Max: 36.9 (12-27-20 @ 19:35)  HR: 69 (12-28-20 @ 15:31) (69 - 102)  BP: 159/55 (12-28-20 @ 15:31) (97/52 - 159/55)  RR: 16 (12-28-20 @ 15:31) (16 - 18)  SpO2: 97% (12-28-20 @ 15:31) (96% - 100%)  Wt(kg): --    HEENT: Head is normocephalic and atraumatic.  ANICTERIC  NECK: Supple. No carotid bruits.  No lymphadenopathy or thyromegaly.    LUNGS:COARSE BREATH SOUNDS    HEART: Regular rate and rhythm without murmur.    ABDOMEN: Soft, nontender, and nondistended.  Positive bowel sounds.  No hepatosplenomegaly was noted. NO REBOUND NO GUARDING    EXTREMITIES: NO EDEMA NO ERYTHEMA    NEUROLOGIC: NON FOCAL      SKIN: No ulceration or induration present. NO RASH        BLOOD CULTURES       URINE CX          LABS:                        9.1    9.42  )-----------( 153      ( 28 Dec 2020 06:35 )             29.2     12-28    141  |  102  |  40.0<H>  ----------------------------<  123<H>  3.8   |  25.0  |  2.08<H>    Ca    8.2<L>      28 Dec 2020 06:35  Phos  3.7     12-28  Mg     2.0     12-28    TPro  5.9<L>  /  Alb  3.0<L>  /  TBili  2.3<H>  /  DBili  2.0<H>  /  AST  100<H>  /  ALT  132<H>  /  AlkPhos  265<H>  12-28    PT/INR - ( 27 Dec 2020 00:16 )   PT: 13.0 sec;   INR: 1.13 ratio         PTT - ( 27 Dec 2020 00:16 )  PTT:26.5 sec      RADIOLOGY & ADDITIONAL STUDIES:      ASSESSMENT/PLAN    89y Female with PMH of HTN, CKD, and diverticulitis presents as transfer from [facility] [place] for concerns of cholangitis and need for ERCP. Patient reports not having any pain prior to presentation. Daughter felt she appeared more weak and tired, which patient corroborates. Her appetite has also been decreased, but does not have to do with pain. Patient denies nausea, vomiting subjective fevers, chills, chest pain, or trouble breathing. Patient expresses she is "very healthy." Bowel movements and urination have been normal for her. Occasional bloody streaks on toilet paper, but patient with known rectal prolapse history. At [facility] [place] patient had TMax of 102.2F, leukocytosis, and abnormally elevate LFT's. Concern was for cholangitis and patient transferred for need of ERCP. She is non-toxic appearing, clinically and hemodynamically stable.    PT WITH POSITIVE BLOOD CX GM NEG RODS PRELIM ECOLI  REPEAT CX ARE PENDING  WILL AWAIT FINAL ID AND SENSITIVITIES  IT APPEARS FAMILY DOES NOT WANT GB SURGERY   MAY NEED TO CONSIDER EXTENDED ABX THERAPY IF NO SOURCE CONTROL  WILL FOLLOW UP WITH FURTHER [id number]              GIOVANNY ARCOS MD

## 2020-12-28 NOTE — PROVIDER CONTACT NOTE (OTHER) - RECOMMENDATIONS
Don't want to cause any more pain or unneeded stress to patient who is awake and alert and Saturating fine on RA.

## 2020-12-28 NOTE — PROGRESS NOTE ADULT - ASSESSMENT
89yo F with PMH of HTN, CKD, and diverticulitis presents as transfer from [facility] [place] for concerns of cholangitis. Patient without any current complaints of pain, though found at [facility] with fever, leukocytosis, and abnormally elevated LFT's. Here, patient has been afebrile with normal WBC and mentation, though LFT's remain abnormal and with an elevated lipase. US with findings of borderline thickened gallbladder wall, cholelithiasis, and possibly trace pericholecystic fluid. Patient likely with choledocholithiasis with or without cholecystitis. Clinically and hemodynamically stable at this time.    Choledocholithiasis   - Trend AM LFTs for possible ERCP, GI stated no ERCP in setting of negative MRCP.  - NPO / IVF  - IV Abx : Zosyn  - DVT PPx  - Repeat AM Labs  - Poss lap cholecystectomy this admission.     Code Status: Full Code

## 2020-12-29 ENCOUNTER — INPATIENT (INPATIENT)
Facility: HOSPITAL | Age: 85
LOS: 2 days | Discharge: HOME HEALTH | End: 2021-01-01
Attending: SURGERY | Admitting: SURGERY
Payer: MEDICARE

## 2020-12-29 ENCOUNTER — OUTPATIENT (OUTPATIENT)
Dept: OUTPATIENT SERVICES | Facility: HOSPITAL | Age: 85
LOS: 1 days | End: 2020-12-29

## 2020-12-29 ENCOUNTER — TRANSCRIPTION ENCOUNTER (OUTPATIENT)
Age: 85
End: 2020-12-29

## 2020-12-29 VITALS
HEART RATE: 68 BPM | RESPIRATION RATE: 18 BRPM | OXYGEN SATURATION: 94 % | TEMPERATURE: 98 F | DIASTOLIC BLOOD PRESSURE: 57 MMHG | SYSTOLIC BLOOD PRESSURE: 155 MMHG

## 2020-12-29 DIAGNOSIS — Z98.890 OTHER SPECIFIED POSTPROCEDURAL STATES: Chronic | ICD-10-CM

## 2020-12-29 DIAGNOSIS — H26.9 UNSPECIFIED CATARACT: Chronic | ICD-10-CM

## 2020-12-29 LAB
ALBUMIN SERPL ELPH-MCNC: 2.9 G/DL — LOW (ref 3.3–5.2)
ALP SERPL-CCNC: 242 U/L — HIGH (ref 40–120)
ALT FLD-CCNC: 92 U/L — HIGH
ANION GAP SERPL CALC-SCNC: 14 MMOL/L — SIGNIFICANT CHANGE UP (ref 5–17)
AST SERPL-CCNC: 48 U/L — HIGH
BASOPHILS # BLD AUTO: 0.02 K/UL — SIGNIFICANT CHANGE UP (ref 0–0.2)
BASOPHILS NFR BLD AUTO: 0.3 % — SIGNIFICANT CHANGE UP (ref 0–2)
BILIRUB SERPL-MCNC: 1 MG/DL — SIGNIFICANT CHANGE UP (ref 0.4–2)
BUN SERPL-MCNC: 33 MG/DL — HIGH (ref 8–20)
CALCIUM SERPL-MCNC: 8.5 MG/DL — LOW (ref 8.6–10.2)
CHLORIDE SERPL-SCNC: 100 MMOL/L — SIGNIFICANT CHANGE UP (ref 98–107)
CO2 SERPL-SCNC: 26 MMOL/L — SIGNIFICANT CHANGE UP (ref 22–29)
CREAT SERPL-MCNC: 1.51 MG/DL — HIGH (ref 0.5–1.3)
EOSINOPHIL # BLD AUTO: 0.12 K/UL — SIGNIFICANT CHANGE UP (ref 0–0.5)
EOSINOPHIL NFR BLD AUTO: 1.5 % — SIGNIFICANT CHANGE UP (ref 0–6)
GLUCOSE SERPL-MCNC: 74 MG/DL — SIGNIFICANT CHANGE UP (ref 70–99)
HCT VFR BLD CALC: 32.4 % — LOW (ref 34.5–45)
HGB BLD-MCNC: 9.9 G/DL — LOW (ref 11.5–15.5)
IMM GRANULOCYTES NFR BLD AUTO: 0.5 % — SIGNIFICANT CHANGE UP (ref 0–1.5)
LIDOCAIN IGE QN: 12 U/L — LOW (ref 22–51)
LYMPHOCYTES # BLD AUTO: 0.69 K/UL — LOW (ref 1–3.3)
LYMPHOCYTES # BLD AUTO: 8.8 % — LOW (ref 13–44)
MAGNESIUM SERPL-MCNC: 2 MG/DL — SIGNIFICANT CHANGE UP (ref 1.6–2.6)
MCHC RBC-ENTMCNC: 30.6 GM/DL — LOW (ref 32–36)
MCHC RBC-ENTMCNC: 31.1 PG — SIGNIFICANT CHANGE UP (ref 27–34)
MCV RBC AUTO: 101.9 FL — HIGH (ref 80–100)
MONOCYTES # BLD AUTO: 0.5 K/UL — SIGNIFICANT CHANGE UP (ref 0–0.9)
MONOCYTES NFR BLD AUTO: 6.4 % — SIGNIFICANT CHANGE UP (ref 2–14)
NEUTROPHILS # BLD AUTO: 6.48 K/UL — SIGNIFICANT CHANGE UP (ref 1.8–7.4)
NEUTROPHILS NFR BLD AUTO: 82.5 % — HIGH (ref 43–77)
PHOSPHATE SERPL-MCNC: 3 MG/DL — SIGNIFICANT CHANGE UP (ref 2.4–4.7)
PLATELET # BLD AUTO: 168 K/UL — SIGNIFICANT CHANGE UP (ref 150–400)
POTASSIUM SERPL-MCNC: 4.3 MMOL/L — SIGNIFICANT CHANGE UP (ref 3.5–5.3)
POTASSIUM SERPL-SCNC: 4.3 MMOL/L — SIGNIFICANT CHANGE UP (ref 3.5–5.3)
PROT SERPL-MCNC: 6.1 G/DL — LOW (ref 6.6–8.7)
RBC # BLD: 3.18 M/UL — LOW (ref 3.8–5.2)
RBC # FLD: 13.7 % — SIGNIFICANT CHANGE UP (ref 10.3–14.5)
SODIUM SERPL-SCNC: 140 MMOL/L — SIGNIFICANT CHANGE UP (ref 135–145)
WBC # BLD: 7.85 K/UL — SIGNIFICANT CHANGE UP (ref 3.8–10.5)
WBC # FLD AUTO: 7.85 K/UL — SIGNIFICANT CHANGE UP (ref 3.8–10.5)

## 2020-12-29 PROCEDURE — 71045 X-RAY EXAM CHEST 1 VIEW: CPT

## 2020-12-29 PROCEDURE — 80076 HEPATIC FUNCTION PANEL: CPT

## 2020-12-29 PROCEDURE — 86769 SARS-COV-2 COVID-19 ANTIBODY: CPT

## 2020-12-29 PROCEDURE — 86901 BLOOD TYPING SEROLOGIC RH(D): CPT

## 2020-12-29 PROCEDURE — 84100 ASSAY OF PHOSPHORUS: CPT

## 2020-12-29 PROCEDURE — 87040 BLOOD CULTURE FOR BACTERIA: CPT

## 2020-12-29 PROCEDURE — 36415 COLL VENOUS BLD VENIPUNCTURE: CPT

## 2020-12-29 PROCEDURE — 86900 BLOOD TYPING SEROLOGIC ABO: CPT

## 2020-12-29 PROCEDURE — 78226 HEPATOBILIARY SYSTEM IMAGING: CPT

## 2020-12-29 PROCEDURE — A9537: CPT

## 2020-12-29 PROCEDURE — 93005 ELECTROCARDIOGRAM TRACING: CPT

## 2020-12-29 PROCEDURE — 76705 ECHO EXAM OF ABDOMEN: CPT

## 2020-12-29 PROCEDURE — 93306 TTE W/DOPPLER COMPLETE: CPT

## 2020-12-29 PROCEDURE — 99285 EMERGENCY DEPT VISIT HI MDM: CPT

## 2020-12-29 PROCEDURE — 83690 ASSAY OF LIPASE: CPT

## 2020-12-29 PROCEDURE — 74183 MRI ABD W/O CNTR FLWD CNTR: CPT

## 2020-12-29 PROCEDURE — 99233 SBSQ HOSP IP/OBS HIGH 50: CPT

## 2020-12-29 PROCEDURE — 85025 COMPLETE CBC W/AUTO DIFF WBC: CPT

## 2020-12-29 PROCEDURE — 83735 ASSAY OF MAGNESIUM: CPT

## 2020-12-29 PROCEDURE — 80048 BASIC METABOLIC PNL TOTAL CA: CPT

## 2020-12-29 PROCEDURE — U0003: CPT

## 2020-12-29 PROCEDURE — 85610 PROTHROMBIN TIME: CPT

## 2020-12-29 PROCEDURE — 85730 THROMBOPLASTIN TIME PARTIAL: CPT

## 2020-12-29 PROCEDURE — 83605 ASSAY OF LACTIC ACID: CPT

## 2020-12-29 PROCEDURE — 99232 SBSQ HOSP IP/OBS MODERATE 35: CPT

## 2020-12-29 PROCEDURE — 86850 RBC ANTIBODY SCREEN: CPT

## 2020-12-29 PROCEDURE — 80053 COMPREHEN METABOLIC PANEL: CPT

## 2020-12-29 RX ADMIN — AMLODIPINE BESYLATE 5 MILLIGRAM(S): 2.5 TABLET ORAL at 04:47

## 2020-12-29 RX ADMIN — SODIUM CHLORIDE 100 MILLILITER(S): 9 INJECTION, SOLUTION INTRAVENOUS at 04:36

## 2020-12-29 RX ADMIN — HEPARIN SODIUM 5000 UNIT(S): 5000 INJECTION INTRAVENOUS; SUBCUTANEOUS at 04:46

## 2020-12-29 RX ADMIN — Medication 20 MILLIGRAM(S): at 04:47

## 2020-12-29 RX ADMIN — PIPERACILLIN AND TAZOBACTAM 25 GRAM(S): 4; .5 INJECTION, POWDER, LYOPHILIZED, FOR SOLUTION INTRAVENOUS at 04:52

## 2020-12-29 RX ADMIN — Medication 25 MILLIGRAM(S): at 04:47

## 2020-12-29 RX ADMIN — Medication 250 MILLIGRAM(S): at 04:47

## 2020-12-29 NOTE — PROGRESS NOTE ADULT - REASON FOR ADMISSION
Choledocholithiasis

## 2020-12-29 NOTE — DISCHARGE NOTE NURSING/CASE MANAGEMENT/SOCIAL WORK - PATIENT PORTAL LINK FT
You can access the FollowMyHealth Patient Portal offered by [facility] by registering at the following website: [email or web address]. By joining [employer]’s FollowMyHealth portal, you will also be able to view your health information using other applications (apps) compatible with our system.

## 2020-12-29 NOTE — PROGRESS NOTE ADULT - ASSESSMENT
89yo F with PMH of HTN, CKD, and diverticulitis presents as transfer from [facility] for concerns of cholangitis. Patient without any current complaints of pain, though found at [facility] with fever, leukocytosis, and abnormally elevated LFT's. Here, patient has been afebrile with normal WBC and mentation, though LFT's remain abnormal and with an elevated lipase. US with findings of borderline thickened gallbladder wall, cholelithiasis, and possibly trace pericholecystic fluid. MRCP was negative and HIDA scan was positive. Per GI patient does not need ERCP and as per family request the patient will be transferred today to [facility] for laparoscopic cholecystectomy. Family already spoke with Dr. Fernandez at [facility].     Choledocholithiasis   - Trend AM labs  - NPO / IVF  - IV Abx : Zosyn  - DVT PPx  - Patient requires lap cholecystectomy, however family would prefer to do it at [facility].  - Transfer patient

## 2020-12-29 NOTE — PROGRESS NOTE ADULT - ASSESSMENT
89y Female with PMH of HTN, CKD, and diverticulitis presents as transfer from [facility] for concerns of cholangitis and need for ERCP. Patient reports not having any pain prior to presentation. Daughter felt she appeared more weak and tired, which patient corroborates. Her appetite has also been decreased, but does not have to do with pain. Patient denies nausea, vomiting subjective fevers, chills, chest pain, or trouble breathing. Patient expresses she is "very healthy." Bowel movements and urination have been normal for her. Occasional bloody streaks on toilet paper, but patient with known rectal prolapse history. At [facility] patient had TMax of 102.2F, leukocytosis, and abnormally elevate LFT's. Concern was for cholangitis and patient transferred for need of ERCP. She is non-toxic appearing, clinically and hemodynamically stable.    PT WITH POSITIVE BLOOD CX GM NEG RODS PRELIM ECOLI  REPEAT CX ARE PENDING   BLOOD CX ECOL PAN SENSITIVE    CAN CHANGE TO ROCEPHIN 2gm  DAILY   IT APPEARS FAMILY  WANTS PT TRANSFERRED BACK TO [place] FOR SURGERY  WILL FOLLOW UP IF PT STAYS AT [place]

## 2020-12-29 NOTE — PROGRESS NOTE ADULT - ASSESSMENT
PT WITH POSITIVE BLOOD CX GM NEG RODS PRELIM ECOLI    89y Female with PMH of HTN, CKD, and diverticulitis presents as transfer from [facility] for concerns of cholangitis and need for ERCP. Patient reports not having any pain prior to presentation. Daughter felt she appeared more weak and tired, which patient corroborates. Her appetite has also been decreased, but does not have to do with pain.  At [facility] patient had TMax of 102.2F, leukocytosis, and abnormally elevate LFT's. Concern was for cholangitis and patient transferred for need of ERCP. U/S noted with gallstones , negative MRCP , patient confused / agitated on 12/28 . Today AAOX3 , no pain , no nausea , no vomiting . HIDA - acute cholecystitis .NPO, ivf , iv abx . GI is following , no need for ERCP . Preliminary blood cultures with positive Gram negative rods. ID consulted . Family requesting to transfer patient  to [facility] for lap dot .        Problem/Recommendation - 1:  Problem: Acute cholecystitis / biliary pancreatitis MRCP negative , no need for ERCP - planned for lap dot . Family requesting transfer back to [facility] .  - keep NPO , IVF , IV Abx , GI / surgery following / ID/ Cardio is following    Preliminary blood cultures with positive Gram negative rods/ bacteriemia - as per ID recommendations . Follow up sensitivity .      Problem/Recommendation - 2:  ·  Problem: Essential hypertension.  Recommendation: - BP still on higher side , may consider to increase Amlodipine 10 mg daily or to add Hydralazine 10 mg ivp PRN  , continue current mgmt with parameters.      Problem/Recommendation - 3:  ·  Problem: Acute metabolic encephalopathy.  Recommendation: - patient with confusion / agitation - likely due to acute illness - resolved , patient is AAOX3 .      Problem/Recommendation - 4:  ·  Problem: Decreased GFR.  Recommendation: - patient with Hx of CKD , not sure what stage ,  Cr - 2.08- likely CKD ,vs FRANSISCA on chronic - continue ivf , avoid nephrotoxic meds , zosyn dose adjusted renally . This am Cr improved - 1.5 , follow bmp in am      Problem/Recommendation - 5:  ·  Problem: Anemia, unspecified type.  Recommendation: - likely chronic anemia.   Patient is medically optimized for planned procedure .

## 2020-12-29 NOTE — PROGRESS NOTE ADULT - SUBJECTIVE AND OBJECTIVE BOX
HPI/OVERNIGHT EVENTS: Patient seen and examined at bedside this AM. No overnight events. Still with RUQ pain. No complaints. Denies fever, chills, nausea, vomiting, chest pain, SOB, dizziness, abd pain or any other concerning symptoms.    Vital Signs Last 24 Hrs  T(C): 36.8 (28 Dec 2020 00:04), Max: 36.9 (27 Dec 2020 16:01)  T(F): 98.3 (28 Dec 2020 00:04), Max: 98.4 (27 Dec 2020 16:01)  HR: 88 (28 Dec 2020 00:04) (79 - 96)  BP: 97/52 (28 Dec 2020 00:04) (97/52 - 181/78)  BP(mean): --  RR: 18 (28 Dec 2020 00:04) (17 - 18)  SpO2: 100% (28 Dec 2020 00:04) (94% - 100%)    I&O's Detail    General: Not in acute distress  Respiratory: Non labored breathing on RA  CV: RRR  GI: Soft, nd. Mild tenderness to palpation on RUQ.   Extremities: Extremities warm, pink, well-perfused. Peripheral edema in b/l lower extremities. No ulcers or lesions. Left lateral calf with bandage from where patient was scraped by car door.       LABS:                        10.5   11.50 )-----------( 178      ( 27 Dec 2020 08:10 )             33.7     12-27    140  |  101  |  39.0<H>  ----------------------------<  99  3.6   |  25.0  |  1.94<H>    Ca    8.4<L>      27 Dec 2020 08:10  Phos  3.5     12-27  Mg     2.0     12-27    TPro  6.7  /  Alb  3.2<L>  /  TBili  3.2<H>  /  DBili  x   /  AST  314<H>  /  ALT  241<H>  /  AlkPhos  401<H>  12-27    PT/INR - ( 27 Dec 2020 00:16 )   PT: 13.0 sec;   INR: 1.13 ratio         PTT - ( 27 Dec 2020 00:16 )  PTT:26.5 sec      MEDICATIONS  (STANDING):  amLODIPine   Tablet 5 milliGRAM(s) Oral daily  latanoprost 0.005% Ophthalmic Solution 1 Drop(s) Both EYES at bedtime  melatonin 1 milliGRAM(s) Oral at bedtime  metoprolol tartrate 25 milliGRAM(s) Oral two times a day  multiple electrolytes Injection Type 1 1000 milliLiter(s) (100 mL/Hr) IV Continuous <Continuous>  piperacillin/tazobactam IVPB.. 3.375 Gram(s) IV Intermittent every 12 hours  saccharomyces boulardii 250 milliGRAM(s) Oral two times a day  torsemide 20 milliGRAM(s) Oral daily    MEDICATIONS  (PRN):  ibuprofen  Tablet. 400 milliGRAM(s) Oral every 6 hours PRN Moderate Pain (4 - 6)    
                                                               [facility]                                                               Office: [address] [address]                                                              Telephone: [phone]. Fax:[phone]                                                                             PROGRESS NOTE  Reason for follow up: Cardiac Risk Stratification  Overnight: No new events.   Update: Patient is more agitated this morning, and was placed on 1:1. Patient denies any chest pain or dyspnea, but states she has to go to the bathroom, and is done "being treated like a child."      Review of symptoms:   Cardiac:  No chest pain. No dyspnea. No palpitations.  Respiratory: No cough. No dyspnea  Gastrointestinal: No diarrhea. + abdominal pain. No bleeding.     Past medical history: No updates.   	  Vitals:  T(C): 36.3 (12-28-20 @ 08:16), Max: 36.9 (12-27-20 @ 16:01)  HR: 102 (12-28-20 @ 08:16) (88 - 102)  BP: 123/96 (12-28-20 @ 08:16) (97/52 - 181/78)  RR: 18 (12-28-20 @ 08:16) (17 - 18)  SpO2: 96% (12-28-20 @ 08:16) (96% - 100%)  Wt(kg): --  I&O's Summary    Weight (kg): 59 (12-26 @ 23:05)      PHYSICAL EXAM:  Constitutional: Comfortable . No acute distress.   HEENT: Atraumatic and normocephalic , neck is supple . no JVD. No carotid bruit. PEERL   CNS: A&Ox3. No focal deficits. EOMI.   Lymph Nodes: Cervical : Not palpable.  Respiratory: CTAB  Cardiovascular: S1S2 RRR. + sys. murmur grade II/VI  Gastrointestinal: Soft non-tender and non distended . +Bowel sounds. negative Hanna's sign  Extremities: BIlateral +3/+3, nonpitting edema.   Psychiatric: Calm . no agitation.  Skin: No skin rash/ulcers visualized to face, hands or feet.        CURRENT MEDICATIONS:  amLODIPine   Tablet 5 milliGRAM(s) Oral daily  metoprolol tartrate 25 milliGRAM(s) Oral two times a day  torsemide 20 milliGRAM(s) Oral daily    piperacillin/tazobactam IVPB..  LORazepam   Injectable  melatonin  latanoprost 0.005% Ophthalmic Solution  multiple electrolytes Injection Type 1  potassium chloride  10 mEq/100 mL IVPB      DIAGNOSTIC TESTING:  [ ] Echocardiogram: Pending      OTHER: 	      LABS:	 	                            9.1    9.42  )-----------( 153      ( 28 Dec 2020 06:35 )             29.2     12-28    141  |  102  |  40.0<H>  ----------------------------<  123<H>  3.8   |  25.0  |  2.08<H>    Ca    8.2<L>      28 Dec 2020 06:35  Phos  3.7     12-28  Mg     2.0     12-28    TPro  5.9<L>  /  Alb  3.0<L>  /  TBili  2.3<H>  /  DBili  2.0<H>  /  AST  100<H>  /  ALT  132<H>  /  AlkPhos  265<H>  12-28    proBNP:   Lipid Profile:   HgA1c:   TSH:           
transfer from [place] for CBD stones   pt feels ok no real pain   afebrile   abd benign  Lfts elevated nGI consulkt ERCP   supportive care   cont abx 
INFECTIOUS DISEASES AND INTERNAL MEDICINE at [place]  =======================================================  Castro Mathis MD  Diplomates American Board of Internal Medicine and Infectious Diseases  Telephone [phone]  Fax            [phone]  =======================================================    ELMERGRIFFIN [id number]    Follow up: gm neg bacteremia ecoli    Allergies:  No Known Allergies      Medications:  amLODIPine   Tablet 5 milliGRAM(s) Oral daily  heparin   Injectable 5000 Unit(s) SubCutaneous every 12 hours  latanoprost 0.005% Ophthalmic Solution 1 Drop(s) Both EYES at bedtime  melatonin 1 milliGRAM(s) Oral at bedtime  metoprolol tartrate 25 milliGRAM(s) Oral two times a day  multiple electrolytes Injection Type 1 1000 milliLiter(s) IV Continuous <Continuous>  piperacillin/tazobactam IVPB.. 3.375 Gram(s) IV Intermittent every 12 hours  saccharomyces boulardii 250 milliGRAM(s) Oral two times a day  torsemide 20 milliGRAM(s) Oral daily    SOCIAL       FAMILY   FAMILY HISTORY:    REVIEW OF SYSTEMS:  CONSTITUTIONAL:  No Fever or chills  HEENT:   No diplopia or blurred vision.  No earache, sore throat or runny nose.  CARDIOVASCULAR:  No pressure, squeezing, strangling, tightness, heaviness or aching about the chest, neck, axilla or epigastrium.  RESPIRATORY:  No cough, shortness of breath, PND or orthopnea.  GASTROINTESTINAL:  No nausea, vomiting or diarrhea.  GENITOURINARY:  No dysuria, frequency or urgency. No Blood in urine  MUSCULOSKELETAL:   moves all joints  SKIN:  No change in skin, hair or nails.  NEUROLOGIC:  No paresthesias, fasciculations, seizures or weakness.  PSYCHIATRIC:  No disorder of thought or mood.  ENDOCRINE:  No heat or cold intolerance, polyuria or polydipsia.  HEMATOLOGICAL:  No easy bruising or bleeding.            Physical Exam:  ICU Vital Signs Last 24 Hrs  T(C): 36.9 (29 Dec 2020 07:17), Max: 36.9 (29 Dec 2020 07:17)  T(F): 98.5 (29 Dec 2020 07:17), Max: 98.5 (29 Dec 2020 07:17)  HR: 63 (29 Dec 2020 07:17) (55 - 69)  BP: 162/72 (29 Dec 2020 07:17) (145/65 - 185/74)  BP(mean): --  ABP: --  ABP(mean): --  RR: 18 (29 Dec 2020 07:17) (16 - 18)  SpO2: 93% (29 Dec 2020 07:17) (93% - 97%)    GEN: NAD,   HEENT: normocephalic and atraumatic. EOMI. SELENA.    NECK: Supple. No carotid bruits.  No lymphadenopathy or thyromegaly.  LUNGS: Clear to auscultation.  HEART: Regular rate and rhythm without murmur.  ABDOMEN: Soft, nontender, and nondistended.  Positive bowel sounds.    : No CVA tenderness  EXTREMITIES: Without any cyanosis, clubbing, rash, lesions or edema.  MSK: no joint swelling  NEUROLOGIC: Cranial nerves II through XII are grossly intact.  PSYCHIATRIC: Appropriate affect .  SKIN: No ulceration or induration present.        Labs:  Vitals:  ============  T(F): 98.5 (29 Dec 2020 07:17), Max: 98.5 (29 Dec 2020 07:17)  HR: 63 (29 Dec 2020 07:17)  BP: 162/72 (29 Dec 2020 07:17)  RR: 18 (29 Dec 2020 07:17)  SpO2: 93% (29 Dec 2020 07:17) (93% - 97%)  temp max in last 48H T(F): , Max: 98.5 (12-29-20 @ 07:17)    =======================================================  Current Antibiotics:  piperacillin/tazobactam IVPB.. 3.375 Gram(s) IV Intermittent every 12 hours    Other medications:  amLODIPine   Tablet 5 milliGRAM(s) Oral daily  heparin   Injectable 5000 Unit(s) SubCutaneous every 12 hours  latanoprost 0.005% Ophthalmic Solution 1 Drop(s) Both EYES at bedtime  melatonin 1 milliGRAM(s) Oral at bedtime  metoprolol tartrate 25 milliGRAM(s) Oral two times a day  multiple electrolytes Injection Type 1 1000 milliLiter(s) IV Continuous <Continuous>  saccharomyces boulardii 250 milliGRAM(s) Oral two times a day  torsemide 20 milliGRAM(s) Oral daily      =======================================================  Labs:                        9.9    7.85  )-----------( 168      ( 29 Dec 2020 07:36 )             32.4      12-29    140  |  100  |  33.0<H>  ----------------------------<  74  4.3   |  26.0  |  1.51<H>    Ca    8.5<L>      29 Dec 2020 07:36  Phos  3.0     12-29  Mg     2.0     12-29    TPro  6.1<L>  /  Alb  2.9<L>  /  TBili  1.0  /  DBili  x   /  AST  48<H>  /  ALT  92<H>  /  AlkPhos  242<H>  12-29      Creatinine, Serum: 1.51 mg/dL (12-29-20 @ 07:36)  Creatinine, Serum: 2.08 mg/dL (12-28-20 @ 06:35)  Creatinine, Serum: 1.94 mg/dL (12-27-20 @ 08:10)  Creatinine, Serum: 1.89 mg/dL (12-27-20 @ 00:16)            WBC Count: 7.85 K/uL (12-29-20 @ 07:36)  WBC Count: 9.42 K/uL (12-28-20 @ 06:35)  WBC Count: 11.50 K/uL (12-27-20 @ 08:10)  WBC Count: 8.98 K/uL (12-27-20 @ 00:16)      COVID-19 IgG Antibody Interpretation: Negative (12-27-20 @ 11:17)  COVID-19 IgG Antibody Index: <0.10 Index (12-27-20 @ 11:17)  COVID-19 PCR: NotDetec (12-27-20 @ 04:29)      Alkaline Phosphatase, Serum: 242 U/L (12-29-20 @ 07:36)  Alkaline Phosphatase, Serum: 265 U/L (12-28-20 @ 06:35)  Alkaline Phosphatase, Serum: 401 U/L (12-27-20 @ 08:10)  Alkaline Phosphatase, Serum: 405 U/L (12-27-20 @ 00:16)  Alanine Aminotransferase (ALT/SGPT): 92 U/L (12-29-20 @ 07:36)  Alanine Aminotransferase (ALT/SGPT): 132 U/L (12-28-20 @ 06:35)  Alanine Aminotransferase (ALT/SGPT): 241 U/L (12-27-20 @ 08:10)  Alanine Aminotransferase (ALT/SGPT): 264 U/L (12-27-20 @ 00:16)  Aspartate Aminotransferase (AST/SGOT): 48 U/L (12-29-20 @ 07:36)  Aspartate Aminotransferase (AST/SGOT): 100 U/L (12-28-20 @ 06:35)  Aspartate Aminotransferase (AST/SGOT): 314 U/L (12-27-20 @ 08:10)  Aspartate Aminotransferase (AST/SGOT): 472 U/L (12-27-20 @ 00:16)  Bilirubin Total, Serum: 1.0 mg/dL (12-29-20 @ 07:36)  Bilirubin Total, Serum: 2.3 mg/dL (12-28-20 @ 06:35)  Bilirubin Total, Serum: 3.2 mg/dL (12-27-20 @ 08:10)  Bilirubin Total, Serum: 2.2 mg/dL (12-27-20 @ 00:16)  Bilirubin Direct, Serum: 2.0 mg/dL (12-28-20 @ 06:35)  
INTERVAL HPI/OVERNIGHT EVENTS:    STATUS POST:      Patient evaluated at bedside. No acute distress. No acute events overnight. Pateint states that she has no pain, nausea, or vomiting. Patient is voiding spontaneously on the bedside commode has not had BM yet. Remains afebrile and HDN stable.    MEDICATIONS  (STANDING):  amLODIPine   Tablet 5 milliGRAM(s) Oral daily  heparin   Injectable 5000 Unit(s) SubCutaneous every 12 hours  latanoprost 0.005% Ophthalmic Solution 1 Drop(s) Both EYES at bedtime  melatonin 1 milliGRAM(s) Oral at bedtime  metoprolol tartrate 25 milliGRAM(s) Oral two times a day  multiple electrolytes Injection Type 1 1000 milliLiter(s) (100 mL/Hr) IV Continuous <Continuous>  piperacillin/tazobactam IVPB.. 3.375 Gram(s) IV Intermittent every 12 hours  saccharomyces boulardii 250 milliGRAM(s) Oral two times a day  torsemide 20 milliGRAM(s) Oral daily    MEDICATIONS  (PRN):      Vital Signs Last 24 Hrs  T(C): 36.3 (28 Dec 2020 23:38), Max: 36.8 (28 Dec 2020 04:40)  T(F): 97.3 (28 Dec 2020 23:38), Max: 98.3 (28 Dec 2020 04:40)  HR: 59 (28 Dec 2020 23:38) (59 - 102)  BP: 145/65 (28 Dec 2020 23:38) (123/96 - 185/74)  BP(mean): --  RR: 18 (28 Dec 2020 23:38) (16 - 18)  SpO2: 96% (28 Dec 2020 23:38) (96% - 97%)    Physical exam  General: Not in acute distress  Respiratory: Non labored breathing on RA  CV: RRR  GI: Soft, nd. non-tender, no rebound, guarding, or rigidity. Negative Hanna sign.   Extremities: Extremities warm, pink, well-perfused. Peripheral edema in b/l lower extremities. No ulcers or lesions.        I&O's Detail      LABS:                        9.1    9.42  )-----------( 153      ( 28 Dec 2020 06:35 )             29.2     12-28    141  |  102  |  40.0<H>  ----------------------------<  123<H>  3.8   |  25.0  |  2.08<H>    Ca    8.2<L>      28 Dec 2020 06:35  Phos  3.7     12-28  Mg     2.0     12-28    TPro  5.9<L>  /  Alb  3.0<L>  /  TBili  2.3<H>  /  DBili  2.0<H>  /  AST  100<H>  /  ALT  132<H>  /  AlkPhos  265<H>  12-28          RADIOLOGY & ADDITIONAL STUDIES:
Patient seen and examined . Laying down in the bed , comfortable , AAOX3 this am , no complaints .     CC : transferred from [facility] [place]  for concern of cholangitis , this am AAOX3 , no complaints     HPI:  89y Female with PMH of HTN, CKD, and diverticulitis presents as transfer from [facility] for concerns of cholangitis and need for ERCP. Patient reports not having any pain prior to presentation. Daughter felt she appeared more weak and tired, which patient corroborates. Her appetite has also been decreased, but does not have to do with pain. Patient denies nausea, vomiting subjective fevers, chills, chest pain, or trouble breathing. Patient expresses she is "very healthy." Bowel movements and urination have been normal for her. Occasional bloody streaks on toilet paper, but patient with known rectal prolapse history. At [facility] patient had TMax of 102.2F, leukocytosis, and abnormally elevate LFT's. Concern was for cholangitis and patient transferred for need of ERCP. She is non-toxic appearing, clinically and hemodynamically stable. Of note, daughter Cecilia ([phone]) denies any cardiac or pulmonary history.  (27 Dec 2020 02:16)      MEDICATIONS  (STANDING):  amLODIPine   Tablet 5 milliGRAM(s) Oral daily  heparin   Injectable 5000 Unit(s) SubCutaneous every 12 hours  latanoprost 0.005% Ophthalmic Solution 1 Drop(s) Both EYES at bedtime  melatonin 1 milliGRAM(s) Oral at bedtime  metoprolol tartrate 25 milliGRAM(s) Oral two times a day  multiple electrolytes Injection Type 1 1000 milliLiter(s) (100 mL/Hr) IV Continuous <Continuous>  piperacillin/tazobactam IVPB.. 3.375 Gram(s) IV Intermittent every 12 hours  saccharomyces boulardii 250 milliGRAM(s) Oral two times a day  torsemide 20 milliGRAM(s) Oral daily    MEDICATIONS  (PRN):      LABS:                          9.9    7.85  )-----------( 168      ( 29 Dec 2020 07:36 )             32.4     12-29    140  |  100  |  33.0<H>  ----------------------------<  74  4.3   |  26.0  |  1.51<H>    Ca    8.5<L>      29 Dec 2020 07:36  Phos  3.0     12-29  Mg     2.0     12-29    TPro  6.1<L>  /  Alb  2.9<L>  /  TBili  1.0  /  DBili  x   /  AST  48<H>  /  ALT  92<H>  /  AlkPhos  242<H>  12-29      RADIOLOGY & ADDITIONAL TESTS:  < from: NM Hepatobiliary Imaging (12.28.20 @ 13:32) >     EXAM:  NM HEPATOBILIARY IMG                          PROCEDURE DATE:  12/28/2020          INTERPRETATION:  CLINICAL STATEMENT: 89-year-old female with elevated LFTs, and CBD dilatation.    RADIOPHARMACEUTICAL: 3.0 mCi mCi Tc-99m-Mebrofenin, I.V.; 2 doses    TECHNIQUE:  Dynamic images of the anterior abdomen were obtained for 1 hour following injection of radiotracer. Morphine 2.0 mg I.V. and a second dose of radiotracer were administered at 1 hour. Dynamic imaging was continued for 30 minutes hour followed by static anterior image of the abdomen 1 hour after morphine administration.    FINDINGS: There is prompt, homogeneous uptake of radiotracer by the hepatocytes. Activity is first seen in the bowel at 35 minutes. The gallbladder is not visualized at any time during the study, despite administration of morphine. There is good clearance of activity from the liver at the end of the study.    IMPRESSION: Abnormal morphine-augmented hepatobiliary scan.    Findings consistent with acute cholecystitis.    Dr. Harrison was notified of these results by telephone with read back  at about 2:55 PM on 12/28/2020.        STEPHANY MARIO MD; Attending Nuclear Medicine  This document has been electronically signed. Dec 28 2020  2:59PM    < end of copied text >    < from: MR MRCP w/wo IV Cont (12.27.20 @ 15:23) >     EXAM:  MR MRCP WAW IC                          PROCEDURE DATE:  12/27/2020          INTERPRETATION:  CLINICAL INFORMATION: 89-year-old female with history of fever, leukocytosis and increased LFTs.    COMPARISON: Ultrasound 12/27/2020    PROCEDURE:  MRI of the abdomen was performed with and without intravenous contrast.  IV Contrast: Gadavist. 5 cc administered, 2.5 cc discarded.    Image quality is degraded by respiratory motion.      FINDINGS:  LOWER CHEST: Small bilateral pleural effusions    LIVER: Diffuse diffusion restriction raising the possibility of intrinsic liver disease.  BILE DUCTS: Normal caliber. No definite choledocholithiasis.  GALLBLADDER: Hydropic gallbladder with gallstones including stones at the neck of the gallbladder. Mural thickening and irregularity at the gallbladder fundus  SPLEEN: Within normal limits.  PANCREAS: Within normal limits.  ADRENALS: Within normal limits.  KIDNEYS/URETERS: Left renal cyst. No hydronephrosis.    VISUALIZED PORTIONS:  BOWEL: Within normal limits.  PERITONEUM: No ascites.  VESSELS: Within normal limits.  RETROPERITONEUM/LYMPH NODES: No lymphadenopathy.  ABDOMINAL WALL: Within normal limits.  BONES: Within normal limits.    IMPRESSION:  Examination limited secondary to respiratory motion.  Hydropic gallbladder with gallstones and mural thickening. Suggest further evaluation with cholescintigraphy.  No biliary dilatation. No definite choledocholithiasis.  Diffusion restriction of the liver raising the possibility of intrinsicliver disease    ARMANDO BETTS MD; Attending Radiologist  This document has been electronically signed. Dec 27 2020  4:14PM    < end of copied text >    < from: TTE Echo Complete w/o Contrast w/ Doppler (12.28.20 @ 11:14) >  EXAM:  ECHO TTE WO CON COMP W DOPP      PROCEDURE DATE:  Dec 28 2020   .      INTERPRETATION:  REPORT:  TRANSTHORACIC ECHOCARDIOGRAM REPORT        Patient Name:   GRIFFIN PAYNE  Patient Location: Inpatient  Medical Rec #:  [id number]  Accession #:      [id number]  Account #:                Height:           60.0 in 152.4 cm  YOB: 1925 Weight:           130.1 lb 59.00 kg  Patient Age:    89 years  BSA:              1.55 m²  Patient Gender: F         BP:               131/68 mmHg      Dateof Exam:        12/28/2020 11:14:27 AM  Sonographer:         Tiffanie Chambers  Referring Physician: Negro Brice    Procedure:     2D Echo/Doppler/Color Doppler Complete.  Indications:   Cardiac murmur, unspecified - R01.1; Encounter for preprocedural                 cardiovascular examination - Z01.810  Diagnosis:     Cardiac murmur, unspecified - R01.1Encounter for preprocedural                 cardiovascular examination - Z01.810  Study Details: Technically adequate study. Study quality was adversely affected                 due to body habitus.        2D AND M-MODE MEASUREMENTS (normal ranges within parentheses):  Left                 Normal   Aorta/Left            Normal  Ventricle:                    Atrium:  IVSd (2D):    0.96  (0.7-1.1) Aortic Root   2.80  (2.4-3.7)                 cm             (Mmode):       cm  LVPWd (2D):   0.96  (0.7-1.1) Left Atrium   3.70  (1.9-4.0)                 cm             (Mmode):       cm  LVIDd (2D):   3.38  (3.4-5.7) LA Volume     23.1       cm             Index        ml/m²  LVIDs (2D):   1.81                 cm  LV FS (2D):   46.4   (>25%)                  %  Relative Wall 0.57   (<0.42)  Thickness    LV SYSTOLIC FUNCTION BY 2D PLANIMETRY (MOD):  EF-A4C View: 58.2 % EF-A2C View: 62.9 % EF-Biplane: 63 %    LV DIASTOLIC FUNCTION:  MV Peak E: 0.78 m/s E/e' Ratio: 16.50  MV Peak A: 1.13 m/s  E/A Ratio: 0.69    SPECTRAL DOPPLER ANALYSIS (where applicable):  LVOT Vmax:  LVOT VTI:  LVOT Diameter: 1.72 cm    Tricuspid Valve and PA/RV Systolic Pressure: TR Max Velocity: 2.84 m/s RA Pressure: 3 mmHg RVSP/PASP: 35.3 mmHg      PHYSICIAN INTERPRETATION:  Left Ventricle: The left ventricular internal cavity size is normal.  Global LV systolic function was normal. Left ventricular ejection fraction, by visual estimation, is 55 to 60%. Spectral Doppler shows impaired relaxation pattern of left ventricular myocardial filling (Grade I diastolic dysfunction).  Right Ventricle: Normal right ventricular size and function.  Left Atrium: Theleft atrium is normal in size.  Right Atrium: The right atrium is normal in size.  Pericardium: There is no evidence of pericardial effusion.  Mitral Valve: Mild thickening and calcification of the anterior and posterior mitral valve leaflets. There is mild to moderate mitral annular calcification. Mild mitral valve regurgitation is seen.  Tricuspid Valve: The tricuspid valve is normal in structure. Moderate tricuspid regurgitation is visualized. Estimated pulmonary artery systolic pressure is 35.3 mmHg assuming a right atrial pressure of 3 mmHg, which is consistent with borderline pulmonary hypertension.  Aortic Valve: The aortic valve is trileaflet. Sclerotic aortic valve with normal opening. Trivial aortic valve regurgitation is seen.  Pulmonic Valve: Structurally normal pulmonic valve, with normal leaflet excursion. Mild pulmonic valve regurgitation.  Aorta: The aortic root and ascending aorta are structurally normal, with no evidence of dilitation.  Pulmonary Artery: The main pulmonary artery is normal in size.  In comparison to the previous echocardiogram(s): There are no prior studies on this patient for comparison purposes.      Summary:   1. Left ventricular ejection fraction, by visual estimation, is 55 to 60%.   2. Normal global left ventricular systolic function.   3. Spectral Doppler shows impaired relaxation pattern of left ventricular myocardial filling (Grade I diastolic dysfunction).   4. There is no evidence of pericardial effusion.   5. Mild mitral valve regurgitation.   6. Mild thickening and calcification of the anterior and posterior mitral valve leaflets.   7. Mild to moderate mitral annular calcification.   8. Moderate tricuspid regurgitation.   9. Sclerotic aortic valve with normal opening.  10. Mild pulmonic valve regurgitation.  11. Estimated pulmonary artery systolic pressure is 35.3 mmHg assuming a right atrial pressure of 3 mmHg, which is consistent with borderline pulmonary hypertension.  12. There are no prior studies on this patient for comparison purposes.    Marisol Barros D.O., Electronically signed on 12/28/2020 at 2:50:17 PM    MARISOL BARROS   This document has been electronically signed. Dec 28 2020 11:14AM    < end of copied text >              REVIEW OF SYSTEMS:    AAOX 3 , all systems are reviewed and are negative .     Vital Signs Last 24 Hrs  T(C): 36.9 (29 Dec 2020 07:17), Max: 36.9 (29 Dec 2020 07:17)  T(F): 98.5 (29 Dec 2020 07:17), Max: 98.5 (29 Dec 2020 07:17)  HR: 63 (29 Dec 2020 07:17) (55 - 80)  BP: 162/72 (29 Dec 2020 07:17) (145/65 - 185/74)  BP(mean): --  RR: 18 (29 Dec 2020 07:17) (16 - 18)  SpO2: 93% (29 Dec 2020 07:17) (93% - 97%)    PHYSICAL EXAM:    GENERAL: NAD, well-groomed, well-developed  HEAD:  Atraumatic, Normocephalic  EYES: EOMI, PERRLA, conjunctiva and sclera clear  NECK: Supple, No JVD, Normal thyroid  NERVOUS SYSTEM:  Alert & Oriented X3, no focal deficit  CHEST/LUNG: CTA b/l ,  no  rales, rhonchi, wheezing, or rubs  HEART: Regular rate and rhythm; No murmurs, rubs, or gallops  ABDOMEN: Soft, Nontender, Nondistended; Bowel sounds present  EXTREMITIES:  2+ Peripheral Pulses, No clubbing, cyanosis, or edema  LYMPH: No lymphadenopathy noted  SKIN: No rashes or lesions  
INTERVAL HPI/OVERNIGHT EVENTS: FU for elevated LFTs, pancreatitis. Agitated this morning. LFTs are improving. MRCP negative. On 1:1 observation    MEDICATIONS  (STANDING):  amLODIPine   Tablet 5 milliGRAM(s) Oral daily  diazepam  Injectable 0.5 milliGRAM(s) IV Push once  latanoprost 0.005% Ophthalmic Solution 1 Drop(s) Both EYES at bedtime  melatonin 1 milliGRAM(s) Oral at bedtime  metoprolol tartrate 25 milliGRAM(s) Oral two times a day  multiple electrolytes Injection Type 1 1000 milliLiter(s) (100 mL/Hr) IV Continuous <Continuous>  piperacillin/tazobactam IVPB.. 3.375 Gram(s) IV Intermittent every 12 hours  potassium chloride  10 mEq/100 mL IVPB 10 milliEquivalent(s) IV Intermittent every 1 hour  saccharomyces boulardii 250 milliGRAM(s) Oral two times a day  torsemide 20 milliGRAM(s) Oral daily    MEDICATIONS  (PRN):      Allergies    No Known Allergies    Intolerances        Vital Signs Last 24 Hrs  T(C): 36.3 (28 Dec 2020 08:16), Max: 36.9 (27 Dec 2020 16:01)  T(F): 97.4 (28 Dec 2020 08:16), Max: 98.4 (27 Dec 2020 16:01)  HR: 102 (28 Dec 2020 08:16) (88 - 102)  BP: 123/96 (28 Dec 2020 08:16) (97/52 - 181/78)  BP(mean): --  RR: 18 (28 Dec 2020 08:16) (17 - 18)  SpO2: 96% (28 Dec 2020 08:16) (96% - 100%)    LABS:                        9.1    9.42  )-----------( 153      ( 28 Dec 2020 06:35 )             29.2     12-28    141  |  102  |  40.0<H>  ----------------------------<  123<H>  3.8   |  25.0  |  2.08<H>    Ca    8.2<L>      28 Dec 2020 06:35  Phos  3.7     12-28  Mg     2.0     12-28    TPro  5.9<L>  /  Alb  3.0<L>  /  TBili  2.3<H>  /  DBili  2.0<H>  /  AST  100<H>  /  ALT  132<H>  /  AlkPhos  265<H>  12-28    PT/INR - ( 27 Dec 2020 00:16 )   PT: 13.0 sec;   INR: 1.13 ratio         PTT - ( 27 Dec 2020 00:16 )  PTT:26.5 sec  Lipase, Serum (12.27.20 @ 08:10)    Lipase, Serum: 312 U/L        RADIOLOGY & ADDITIONAL TESTS:  < from: MR MRCP w/wo IV Cont (12.27.20 @ 15:23) >     EXAM:  MR MRCP WAW IC                          PROCEDURE DATE:  12/27/2020          INTERPRETATION:  CLINICAL INFORMATION: 89-year-old female with history of fever, leukocytosis and increased LFTs.    COMPARISON: Ultrasound 12/27/2020    PROCEDURE:  MRI of the abdomen was performed with and without intravenous contrast.  IV Contrast: Gadavist. 5 cc administered, 2.5 cc discarded.    Image quality is degraded by respiratory motion.      FINDINGS:  LOWER CHEST: Small bilateral pleural effusions    LIVER: Diffuse diffusion restriction raising the possibility of intrinsic liver disease.  BILE DUCTS: Normal caliber. No definite choledocholithiasis.  GALLBLADDER: Hydropic gallbladder with gallstones including stones at the neck of the gallbladder. Mural thickening and irregularity at the gallbladder fundus  SPLEEN: Within normal limits.  PANCREAS: Within normal limits.  ADRENALS: Within normal limits.  KIDNEYS/URETERS: Left renal cyst. No hydronephrosis.    VISUALIZED PORTIONS:  BOWEL: Within normal limits.  PERITONEUM: No ascites.  VESSELS: Within normal limits.  RETROPERITONEUM/LYMPH NODES: No lymphadenopathy.  ABDOMINAL WALL: Within normal limits.  BONES: Within normal limits.    IMPRESSION:  Examination limited secondary to respiratory motion.  Hydropic gallbladder with gallstones and mural thickening. Suggest further evaluation with cholescintigraphy.  No biliary dilatation. No definite choledocholithiasis.  Diffusion restriction of the liver raising the possibility of intrinsicliver disease              ARMANDO BETTS MD; Attending Radiologist  This document has been electronically signed. Dec 27 2020  4:14PM    < end of copied text >  COVID-19 PCR . (12.27.20 @ 04:29)    COVID-19 PCR: NotDetec: You can help in the fight against COVID-19. [facility] may contact  you to see if you are interested in voluntarily participating in one of  our clinical trials.  Testing is performed using polymerase chain reaction (PCR) or  transcription mediated amplification (TMA). This COVID-19 (SARS-CoV-2)  nucleic acid amplification test was validated by [employer] and is  in use under the FDA Emergency Use Authorization (EUA) for clinical labs  CLIA-certified to perform high complexity testing. Test results should be  correlated with clinical presentation, patient history, and epidemiology.

## 2020-12-30 ENCOUNTER — OUTPATIENT (OUTPATIENT)
Dept: OUTPATIENT SERVICES | Facility: HOSPITAL | Age: 85
LOS: 1 days | End: 2020-12-30

## 2020-12-30 DIAGNOSIS — H26.9 UNSPECIFIED CATARACT: Chronic | ICD-10-CM

## 2020-12-30 DIAGNOSIS — Z98.890 OTHER SPECIFIED POSTPROCEDURAL STATES: Chronic | ICD-10-CM

## 2020-12-30 PROCEDURE — 99222 1ST HOSP IP/OBS MODERATE 55: CPT

## 2021-01-01 LAB
CULTURE RESULTS: SIGNIFICANT CHANGE UP
CULTURE RESULTS: SIGNIFICANT CHANGE UP
SPECIMEN SOURCE: SIGNIFICANT CHANGE UP
SPECIMEN SOURCE: SIGNIFICANT CHANGE UP

## 2021-04-01 ENCOUNTER — OUTPATIENT (OUTPATIENT)
Dept: OUTPATIENT SERVICES | Facility: HOSPITAL | Age: 86
LOS: 1 days | End: 2021-04-01

## 2021-04-01 ENCOUNTER — INPATIENT (INPATIENT)
Facility: HOSPITAL | Age: 86
LOS: 6 days | Discharge: HOME HEALTH | End: 2021-04-08
Attending: INTERNAL MEDICINE
Payer: MEDICARE

## 2021-04-01 DIAGNOSIS — Z98.890 OTHER SPECIFIED POSTPROCEDURAL STATES: Chronic | ICD-10-CM

## 2021-04-01 DIAGNOSIS — H26.9 UNSPECIFIED CATARACT: Chronic | ICD-10-CM

## 2021-04-01 PROCEDURE — 71250 CT THORAX DX C-: CPT | Mod: 26

## 2021-04-01 PROCEDURE — 71045 X-RAY EXAM CHEST 1 VIEW: CPT | Mod: 26

## 2021-04-01 PROCEDURE — 99285 EMERGENCY DEPT VISIT HI MDM: CPT | Mod: CS

## 2021-04-01 PROCEDURE — 70450 CT HEAD/BRAIN W/O DYE: CPT | Mod: 26

## 2021-04-01 PROCEDURE — 93010 ELECTROCARDIOGRAM REPORT: CPT

## 2021-04-01 PROCEDURE — 72125 CT NECK SPINE W/O DYE: CPT | Mod: 26

## 2021-04-01 PROCEDURE — 74176 CT ABD & PELVIS W/O CONTRAST: CPT | Mod: 26

## 2021-04-01 PROCEDURE — 73502 X-RAY EXAM HIP UNI 2-3 VIEWS: CPT | Mod: 26,LT

## 2021-04-02 ENCOUNTER — OUTPATIENT (OUTPATIENT)
Dept: OUTPATIENT SERVICES | Facility: HOSPITAL | Age: 86
LOS: 1 days | End: 2021-04-02

## 2021-04-02 DIAGNOSIS — H26.9 UNSPECIFIED CATARACT: Chronic | ICD-10-CM

## 2021-04-02 DIAGNOSIS — Z98.890 OTHER SPECIFIED POSTPROCEDURAL STATES: Chronic | ICD-10-CM

## 2021-04-02 PROCEDURE — 93970 EXTREMITY STUDY: CPT | Mod: 26

## 2021-04-03 ENCOUNTER — OUTPATIENT (OUTPATIENT)
Dept: OUTPATIENT SERVICES | Facility: HOSPITAL | Age: 86
LOS: 1 days | End: 2021-04-03

## 2021-04-03 DIAGNOSIS — H26.9 UNSPECIFIED CATARACT: Chronic | ICD-10-CM

## 2021-04-03 DIAGNOSIS — Z98.890 OTHER SPECIFIED POSTPROCEDURAL STATES: Chronic | ICD-10-CM

## 2021-04-04 ENCOUNTER — OUTPATIENT (OUTPATIENT)
Dept: OUTPATIENT SERVICES | Facility: HOSPITAL | Age: 86
LOS: 1 days | End: 2021-04-04

## 2021-04-04 DIAGNOSIS — Z98.890 OTHER SPECIFIED POSTPROCEDURAL STATES: Chronic | ICD-10-CM

## 2021-04-04 DIAGNOSIS — H26.9 UNSPECIFIED CATARACT: Chronic | ICD-10-CM

## 2021-04-05 ENCOUNTER — OUTPATIENT (OUTPATIENT)
Dept: OUTPATIENT SERVICES | Facility: HOSPITAL | Age: 86
LOS: 1 days | End: 2021-04-05

## 2021-04-05 DIAGNOSIS — H26.9 UNSPECIFIED CATARACT: Chronic | ICD-10-CM

## 2021-04-05 DIAGNOSIS — Z98.890 OTHER SPECIFIED POSTPROCEDURAL STATES: Chronic | ICD-10-CM

## 2021-04-05 PROCEDURE — 71045 X-RAY EXAM CHEST 1 VIEW: CPT | Mod: 26

## 2021-04-06 ENCOUNTER — OUTPATIENT (OUTPATIENT)
Dept: OUTPATIENT SERVICES | Facility: HOSPITAL | Age: 86
LOS: 1 days | End: 2021-04-06

## 2021-04-06 DIAGNOSIS — Z98.890 OTHER SPECIFIED POSTPROCEDURAL STATES: Chronic | ICD-10-CM

## 2021-04-06 DIAGNOSIS — H26.9 UNSPECIFIED CATARACT: Chronic | ICD-10-CM

## 2021-04-08 ENCOUNTER — OUTPATIENT (OUTPATIENT)
Dept: OUTPATIENT SERVICES | Facility: HOSPITAL | Age: 86
LOS: 1 days | End: 2021-04-08

## 2021-04-08 DIAGNOSIS — Z98.890 OTHER SPECIFIED POSTPROCEDURAL STATES: Chronic | ICD-10-CM

## 2021-04-08 DIAGNOSIS — H26.9 UNSPECIFIED CATARACT: Chronic | ICD-10-CM

## 2021-04-26 ENCOUNTER — EMERGENCY (EMERGENCY)
Facility: HOSPITAL | Age: 86
LOS: 1 days | End: 2021-04-26
Admitting: EMERGENCY MEDICINE
Payer: MEDICARE

## 2021-04-26 DIAGNOSIS — H26.9 UNSPECIFIED CATARACT: Chronic | ICD-10-CM

## 2021-04-26 DIAGNOSIS — Z98.890 OTHER SPECIFIED POSTPROCEDURAL STATES: Chronic | ICD-10-CM

## 2021-04-26 PROCEDURE — 99284 EMERGENCY DEPT VISIT MOD MDM: CPT

## 2021-04-26 PROCEDURE — 71045 X-RAY EXAM CHEST 1 VIEW: CPT | Mod: 26

## 2021-04-26 PROCEDURE — 93971 EXTREMITY STUDY: CPT | Mod: 26,LT

## 2021-05-24 ENCOUNTER — OUTPATIENT (OUTPATIENT)
Dept: OUTPATIENT SERVICES | Facility: HOSPITAL | Age: 86
LOS: 1 days | Discharge: HOME | End: 2021-05-24

## 2021-05-24 DIAGNOSIS — Z98.890 OTHER SPECIFIED POSTPROCEDURAL STATES: Chronic | ICD-10-CM

## 2021-05-24 DIAGNOSIS — H26.9 UNSPECIFIED CATARACT: Chronic | ICD-10-CM

## 2021-06-16 ENCOUNTER — EMERGENCY (EMERGENCY)
Facility: HOSPITAL | Age: 86
LOS: 1 days | End: 2021-06-16
Admitting: EMERGENCY MEDICINE
Payer: MEDICARE

## 2021-06-16 DIAGNOSIS — H26.9 UNSPECIFIED CATARACT: Chronic | ICD-10-CM

## 2021-06-16 DIAGNOSIS — Z98.890 OTHER SPECIFIED POSTPROCEDURAL STATES: Chronic | ICD-10-CM

## 2021-06-16 PROCEDURE — 99283 EMERGENCY DEPT VISIT LOW MDM: CPT

## 2021-07-12 ENCOUNTER — EMERGENCY (EMERGENCY)
Facility: HOSPITAL | Age: 86
LOS: 1 days | End: 2021-07-12
Admitting: EMERGENCY MEDICINE
Payer: MEDICARE

## 2021-07-12 DIAGNOSIS — Z98.890 OTHER SPECIFIED POSTPROCEDURAL STATES: Chronic | ICD-10-CM

## 2021-07-12 DIAGNOSIS — H26.9 UNSPECIFIED CATARACT: Chronic | ICD-10-CM

## 2021-07-12 PROCEDURE — 99284 EMERGENCY DEPT VISIT MOD MDM: CPT

## 2021-07-12 PROCEDURE — 72170 X-RAY EXAM OF PELVIS: CPT | Mod: 26

## 2021-07-12 PROCEDURE — 71046 X-RAY EXAM CHEST 2 VIEWS: CPT | Mod: 26

## 2021-07-12 PROCEDURE — 73130 X-RAY EXAM OF HAND: CPT | Mod: 26,LT

## 2021-07-12 PROCEDURE — 74176 CT ABD & PELVIS W/O CONTRAST: CPT | Mod: 26

## 2021-07-14 ENCOUNTER — INPATIENT (INPATIENT)
Facility: HOSPITAL | Age: 86
LOS: 5 days | Discharge: HOME HEALTH | End: 2021-07-20
Attending: FAMILY MEDICINE | Admitting: STUDENT IN AN ORGANIZED HEALTH CARE EDUCATION/TRAINING PROGRAM
Payer: MEDICARE

## 2021-07-14 ENCOUNTER — OUTPATIENT (OUTPATIENT)
Dept: OUTPATIENT SERVICES | Facility: HOSPITAL | Age: 86
LOS: 1 days | End: 2021-07-14

## 2021-07-14 DIAGNOSIS — Z98.890 OTHER SPECIFIED POSTPROCEDURAL STATES: Chronic | ICD-10-CM

## 2021-07-14 DIAGNOSIS — H26.9 UNSPECIFIED CATARACT: Chronic | ICD-10-CM

## 2021-07-14 PROCEDURE — 93010 ELECTROCARDIOGRAM REPORT: CPT

## 2021-07-14 PROCEDURE — 99284 EMERGENCY DEPT VISIT MOD MDM: CPT

## 2021-07-14 PROCEDURE — 93970 EXTREMITY STUDY: CPT | Mod: 26

## 2021-07-15 ENCOUNTER — OUTPATIENT (OUTPATIENT)
Dept: OUTPATIENT SERVICES | Facility: HOSPITAL | Age: 86
LOS: 1 days | End: 2021-07-15

## 2021-07-15 DIAGNOSIS — Z98.890 OTHER SPECIFIED POSTPROCEDURAL STATES: Chronic | ICD-10-CM

## 2021-07-15 DIAGNOSIS — H26.9 UNSPECIFIED CATARACT: Chronic | ICD-10-CM

## 2021-07-16 ENCOUNTER — OUTPATIENT (OUTPATIENT)
Dept: OUTPATIENT SERVICES | Facility: HOSPITAL | Age: 86
LOS: 1 days | End: 2021-07-16

## 2021-07-16 DIAGNOSIS — H26.9 UNSPECIFIED CATARACT: Chronic | ICD-10-CM

## 2021-07-16 DIAGNOSIS — Z98.890 OTHER SPECIFIED POSTPROCEDURAL STATES: Chronic | ICD-10-CM

## 2021-07-17 ENCOUNTER — OUTPATIENT (OUTPATIENT)
Dept: OUTPATIENT SERVICES | Facility: HOSPITAL | Age: 86
LOS: 1 days | End: 2021-07-17

## 2021-07-17 DIAGNOSIS — H26.9 UNSPECIFIED CATARACT: Chronic | ICD-10-CM

## 2021-07-17 DIAGNOSIS — Z98.890 OTHER SPECIFIED POSTPROCEDURAL STATES: Chronic | ICD-10-CM

## 2021-07-18 ENCOUNTER — OUTPATIENT (OUTPATIENT)
Dept: OUTPATIENT SERVICES | Facility: HOSPITAL | Age: 86
LOS: 1 days | End: 2021-07-18

## 2021-07-18 DIAGNOSIS — Z98.890 OTHER SPECIFIED POSTPROCEDURAL STATES: Chronic | ICD-10-CM

## 2021-07-18 DIAGNOSIS — H26.9 UNSPECIFIED CATARACT: Chronic | ICD-10-CM

## 2021-07-19 ENCOUNTER — OUTPATIENT (OUTPATIENT)
Dept: OUTPATIENT SERVICES | Facility: HOSPITAL | Age: 86
LOS: 1 days | End: 2021-07-19

## 2021-07-19 DIAGNOSIS — H26.9 UNSPECIFIED CATARACT: Chronic | ICD-10-CM

## 2021-07-19 DIAGNOSIS — Z98.890 OTHER SPECIFIED POSTPROCEDURAL STATES: Chronic | ICD-10-CM

## 2021-07-20 ENCOUNTER — OUTPATIENT (OUTPATIENT)
Dept: OUTPATIENT SERVICES | Facility: HOSPITAL | Age: 86
LOS: 1 days | End: 2021-07-20

## 2021-07-20 DIAGNOSIS — H26.9 UNSPECIFIED CATARACT: Chronic | ICD-10-CM

## 2021-07-20 DIAGNOSIS — Z98.890 OTHER SPECIFIED POSTPROCEDURAL STATES: Chronic | ICD-10-CM

## 2021-07-22 PROCEDURE — 93010 ELECTROCARDIOGRAM REPORT: CPT

## 2021-07-22 PROCEDURE — 99285 EMERGENCY DEPT VISIT HI MDM: CPT

## 2021-07-22 PROCEDURE — 71045 X-RAY EXAM CHEST 1 VIEW: CPT | Mod: 26

## 2021-07-23 ENCOUNTER — INPATIENT (INPATIENT)
Facility: HOSPITAL | Age: 86
LOS: 4 days | Discharge: HOME | End: 2021-07-28
Payer: MEDICARE

## 2021-07-23 ENCOUNTER — OUTPATIENT (OUTPATIENT)
Dept: OUTPATIENT SERVICES | Facility: HOSPITAL | Age: 86
LOS: 1 days | End: 2021-07-23

## 2021-07-23 DIAGNOSIS — Z98.890 OTHER SPECIFIED POSTPROCEDURAL STATES: Chronic | ICD-10-CM

## 2021-07-23 DIAGNOSIS — H26.9 UNSPECIFIED CATARACT: Chronic | ICD-10-CM

## 2021-07-24 ENCOUNTER — OUTPATIENT (OUTPATIENT)
Dept: OUTPATIENT SERVICES | Facility: HOSPITAL | Age: 86
LOS: 1 days | End: 2021-07-24

## 2021-07-24 DIAGNOSIS — Z98.890 OTHER SPECIFIED POSTPROCEDURAL STATES: Chronic | ICD-10-CM

## 2021-07-24 DIAGNOSIS — H26.9 UNSPECIFIED CATARACT: Chronic | ICD-10-CM

## 2021-07-25 ENCOUNTER — OUTPATIENT (OUTPATIENT)
Dept: OUTPATIENT SERVICES | Facility: HOSPITAL | Age: 86
LOS: 1 days | End: 2021-07-25

## 2021-07-25 DIAGNOSIS — H26.9 UNSPECIFIED CATARACT: Chronic | ICD-10-CM

## 2021-07-25 DIAGNOSIS — Z98.890 OTHER SPECIFIED POSTPROCEDURAL STATES: Chronic | ICD-10-CM

## 2021-07-26 ENCOUNTER — OUTPATIENT (OUTPATIENT)
Dept: OUTPATIENT SERVICES | Facility: HOSPITAL | Age: 86
LOS: 1 days | End: 2021-07-26

## 2021-07-26 DIAGNOSIS — Z98.890 OTHER SPECIFIED POSTPROCEDURAL STATES: Chronic | ICD-10-CM

## 2021-07-26 DIAGNOSIS — H26.9 UNSPECIFIED CATARACT: Chronic | ICD-10-CM

## 2021-07-27 ENCOUNTER — OUTPATIENT (OUTPATIENT)
Dept: OUTPATIENT SERVICES | Facility: HOSPITAL | Age: 86
LOS: 1 days | End: 2021-07-27

## 2021-07-27 DIAGNOSIS — Z98.890 OTHER SPECIFIED POSTPROCEDURAL STATES: Chronic | ICD-10-CM

## 2021-07-27 DIAGNOSIS — H26.9 UNSPECIFIED CATARACT: Chronic | ICD-10-CM

## 2021-08-04 ENCOUNTER — EMERGENCY (EMERGENCY)
Facility: HOSPITAL | Age: 86
LOS: 1 days | End: 2021-08-04
Payer: MEDICARE

## 2021-08-04 DIAGNOSIS — H26.9 UNSPECIFIED CATARACT: Chronic | ICD-10-CM

## 2021-08-04 DIAGNOSIS — Z98.890 OTHER SPECIFIED POSTPROCEDURAL STATES: Chronic | ICD-10-CM

## 2021-08-04 PROCEDURE — 93010 ELECTROCARDIOGRAM REPORT: CPT

## 2021-08-04 PROCEDURE — 99285 EMERGENCY DEPT VISIT HI MDM: CPT

## 2021-08-04 PROCEDURE — 71045 X-RAY EXAM CHEST 1 VIEW: CPT | Mod: 26

## 2021-08-05 ENCOUNTER — OUTPATIENT (OUTPATIENT)
Dept: OUTPATIENT SERVICES | Facility: HOSPITAL | Age: 86
LOS: 1 days | End: 2021-08-05

## 2021-08-05 DIAGNOSIS — H26.9 UNSPECIFIED CATARACT: Chronic | ICD-10-CM

## 2021-08-05 DIAGNOSIS — Z98.890 OTHER SPECIFIED POSTPROCEDURAL STATES: Chronic | ICD-10-CM

## 2021-08-06 ENCOUNTER — OUTPATIENT (OUTPATIENT)
Dept: OUTPATIENT SERVICES | Facility: HOSPITAL | Age: 86
LOS: 1 days | End: 2021-08-06

## 2021-08-06 DIAGNOSIS — Z98.890 OTHER SPECIFIED POSTPROCEDURAL STATES: Chronic | ICD-10-CM

## 2021-08-06 DIAGNOSIS — H26.9 UNSPECIFIED CATARACT: Chronic | ICD-10-CM

## 2021-10-06 ENCOUNTER — INPATIENT (INPATIENT)
Facility: HOSPITAL | Age: 86
LOS: 1 days | Discharge: HOME HEALTH | End: 2021-10-08
Payer: MEDICARE

## 2021-10-06 DIAGNOSIS — H26.9 UNSPECIFIED CATARACT: Chronic | ICD-10-CM

## 2021-10-06 DIAGNOSIS — Z98.890 OTHER SPECIFIED POSTPROCEDURAL STATES: Chronic | ICD-10-CM

## 2021-10-06 PROCEDURE — 93970 EXTREMITY STUDY: CPT | Mod: 26

## 2021-10-06 PROCEDURE — 93010 ELECTROCARDIOGRAM REPORT: CPT

## 2021-10-06 PROCEDURE — 99285 EMERGENCY DEPT VISIT HI MDM: CPT

## 2022-01-16 PROBLEM — N18.9 CHRONIC KIDNEY DISEASE, UNSPECIFIED: Chronic | Status: ACTIVE | Noted: 2020-12-27

## 2022-01-16 PROBLEM — N81.4 UTEROVAGINAL PROLAPSE, UNSPECIFIED: Chronic | Status: ACTIVE | Noted: 2020-12-27

## 2022-01-16 PROBLEM — R60.0 LOCALIZED EDEMA: Chronic | Status: ACTIVE | Noted: 2020-12-27

## 2022-01-16 PROBLEM — I82.411 ACUTE EMBOLISM AND THROMBOSIS OF RIGHT FEMORAL VEIN: Chronic | Status: ACTIVE | Noted: 2020-12-27

## 2022-01-16 PROBLEM — K57.92 DIVERTICULITIS OF INTESTINE, PART UNSPECIFIED, WITHOUT PERFORATION OR ABSCESS WITHOUT BLEEDING: Chronic | Status: ACTIVE | Noted: 2020-12-27

## 2022-01-16 PROBLEM — Z86.19 PERSONAL HISTORY OF OTHER INFECTIOUS AND PARASITIC DISEASES: Chronic | Status: ACTIVE | Noted: 2020-12-27

## 2022-01-16 PROBLEM — K62.3 RECTAL PROLAPSE: Chronic | Status: ACTIVE | Noted: 2020-12-27

## 2022-01-16 PROBLEM — N39.0 URINARY TRACT INFECTION, SITE NOT SPECIFIED: Chronic | Status: ACTIVE | Noted: 2020-12-27

## 2022-01-16 PROBLEM — N81.6 RECTOCELE: Chronic | Status: ACTIVE | Noted: 2020-12-27

## 2022-02-17 ENCOUNTER — APPOINTMENT (OUTPATIENT)
Dept: ULTRASOUND IMAGING | Facility: CLINIC | Age: 87
End: 2022-02-17
Payer: MEDICARE

## 2022-02-17 PROCEDURE — 93971 EXTREMITY STUDY: CPT | Mod: RT

## 2022-03-01 NOTE — CONSULT NOTE ADULT - ASSESSMENT
89y Female with PMH of HTN, CKD, and diverticulitis presents as transfer from [facility] [place] for concerns of cholangitis and need for ERCP. Patient reports not having any pain prior to presentation. Daughter felt she appeared more weak and tired, which patient corroborates. Her appetite has also been decreased, but does not have to do with pain. Patient denies nausea, vomiting subjective fevers, chills, chest pain, or trouble breathing. Patient expresses she is "very healthy." Bowel movements and urination have been normal for her. Occasional bloody streaks on toilet paper, but patient with known rectal prolapse history. At [facility] patient had TMax of 102.2F, leukocytosis, and abnormally elevate LFT's. Concern was for cholangitis and patient transferred for need of ERCP. She is non-toxic appearing, clinically and hemodynamically stable. Of note, daughter Cecilia ([phone]) denies any cardiac or pulmonary history.  (27 Dec 2020 02:16)    Above Appreciated  Cardiology consult requested for cardiac risk stratification for ERCP. Pt states her daughter thought she did not look well and brought her to [facility] where she was found to have cholangitis. Pt was transferred to [facility]ED for further evaluation. Pt denies chest pain, palpitations, SOB, fevers, chills, cough, n/v/d, abd pain. Pt states she is able to ambulate with walker without eliciting anginal symptoms. Pt has no recent cardiac w/u.       Cardiac Risk Stratification  -RCRI Risk score-1 (elevated preop creatnin +1) Class II Risk, 0.9% risk of major cardiac event  - ECG-NSR HR @ 84  -Echo-ordered and pending  - Pending Echo results. No active chest pain, evidence of ischemia, decompensated CHF, significant valvular abnormality, or unstable arrhythmia and therefore no absolute cardiac contraindication to the planned surgical procedure.     HTN  -Recent BP-165/80  -Pt s/p plasmalyte infusion  -Cont current antihypertensive medication regimen 89y Female with PMH of HTN, CKD, and diverticulitis presents as transfer from [facility] [place] [facility] for concerns of cholangitis and need for ERCP. Patient reports not having any pain prior to presentation. Daughter felt she appeared more weak and tired, which patient corroborates. Her appetite has also been decreased, but does not have to do with pain. Patient denies nausea, vomiting subjective fevers, chills, chest pain, or trouble breathing. Patient expresses she is "very healthy." Bowel movements and urination have been normal for her. Occasional bloody streaks on toilet paper, but patient with known rectal prolapse history. At [facility] patient had TMax of 102.2F, leukocytosis, and abnormally elevate LFT's. Concern was for cholangitis and patient transferred for need of ERCP. She is non-toxic appearing, clinically and hemodynamically stable. Of note, daughter Cecilia ([phone]) denies any cardiac or pulmonary history.  (27 Dec 2020 02:16)    Above Appreciated  Cardiology consult requested for cardiac risk stratification for ERCP. Pt states her daughter thought she did not look well and brought her to [facility] where she was found to have cholangitis. Pt was transferred to [facility]ED for further evaluation. Pt denies chest pain, palpitations, SOB, fevers, chills, cough, n/v/d, abd pain. Pt states she is able to ambulate with walker without eliciting anginal symptoms. Pt has no recent cardiac w/u.       Cardiac Risk Stratification  -RCRI Risk score-0 Class II Risk, 3.9% risk of major cardiac event  - ECG-NSR HR @ 84  -Echo-ordered and pending  - Pending Echo results. No active chest pain, evidence of ischemia, decompensated CHF, significant valvular abnormality, or unstable arrhythmia and therefore no absolute cardiac contraindication to the planned surgical procedure.     HTN  -Recent BP-165/80  -Pt s/p plasmalyte infusion  -Cont current antihypertensive medication regimen  - start Norvasc 5 mg po q daily   - monitor BP  Complex Repair Preamble Text (Leave Blank If You Do Not Want): Extensive wide undermining was performed.

## 2022-11-11 ENCOUNTER — APPOINTMENT (OUTPATIENT)
Dept: ULTRASOUND IMAGING | Facility: CLINIC | Age: 87
End: 2022-11-11

## 2022-11-11 PROCEDURE — 93970 EXTREMITY STUDY: CPT

## 2023-08-07 ENCOUNTER — NON-APPOINTMENT (OUTPATIENT)
Age: 88
End: 2023-08-07

## 2023-11-16 ENCOUNTER — OUTPATIENT (OUTPATIENT)
Dept: OUTPATIENT SERVICES | Facility: HOSPITAL | Age: 88
LOS: 1 days | End: 2023-11-16
Payer: MEDICARE

## 2023-11-16 DIAGNOSIS — D64.9 ANEMIA, UNSPECIFIED: ICD-10-CM

## 2023-11-16 DIAGNOSIS — Z98.890 OTHER SPECIFIED POSTPROCEDURAL STATES: Chronic | ICD-10-CM

## 2023-11-16 DIAGNOSIS — H26.9 UNSPECIFIED CATARACT: Chronic | ICD-10-CM

## 2023-11-20 ENCOUNTER — APPOINTMENT (OUTPATIENT)
Age: 88
End: 2023-11-20
Payer: MEDICARE

## 2023-11-20 ENCOUNTER — APPOINTMENT (OUTPATIENT)
Age: 88
End: 2023-11-20

## 2023-11-20 ENCOUNTER — RESULT REVIEW (OUTPATIENT)
Age: 88
End: 2023-11-20

## 2023-11-20 VITALS
TEMPERATURE: 98.1 F | HEART RATE: 86 BPM | OXYGEN SATURATION: 82 % | WEIGHT: 125 LBS | SYSTOLIC BLOOD PRESSURE: 119 MMHG | BODY MASS INDEX: 24.41 KG/M2 | DIASTOLIC BLOOD PRESSURE: 52 MMHG

## 2023-11-20 DIAGNOSIS — N18.9 CHRONIC KIDNEY DISEASE, UNSPECIFIED: ICD-10-CM

## 2023-11-20 DIAGNOSIS — D63.1 CHRONIC KIDNEY DISEASE, UNSPECIFIED: ICD-10-CM

## 2023-11-20 DIAGNOSIS — Z86.718 PERSONAL HISTORY OF OTHER VENOUS THROMBOSIS AND EMBOLISM: ICD-10-CM

## 2023-11-20 LAB
ALBUMIN SERPL ELPH-MCNC: 3.8 G/DL — SIGNIFICANT CHANGE UP (ref 3.3–5)
ALBUMIN SERPL ELPH-MCNC: 3.8 G/DL — SIGNIFICANT CHANGE UP (ref 3.3–5)
ALP SERPL-CCNC: 131 U/L — HIGH (ref 40–120)
ALP SERPL-CCNC: 131 U/L — HIGH (ref 40–120)
ALT FLD-CCNC: 12 U/L — SIGNIFICANT CHANGE UP (ref 10–45)
ALT FLD-CCNC: 12 U/L — SIGNIFICANT CHANGE UP (ref 10–45)
ANION GAP SERPL CALC-SCNC: 15 MMOL/L — SIGNIFICANT CHANGE UP (ref 5–17)
ANION GAP SERPL CALC-SCNC: 15 MMOL/L — SIGNIFICANT CHANGE UP (ref 5–17)
AST SERPL-CCNC: 24 U/L — SIGNIFICANT CHANGE UP (ref 10–40)
AST SERPL-CCNC: 24 U/L — SIGNIFICANT CHANGE UP (ref 10–40)
BILIRUB SERPL-MCNC: 0.3 MG/DL — SIGNIFICANT CHANGE UP (ref 0.2–1.2)
BILIRUB SERPL-MCNC: 0.3 MG/DL — SIGNIFICANT CHANGE UP (ref 0.2–1.2)
BUN SERPL-MCNC: 47 MG/DL — HIGH (ref 7–23)
BUN SERPL-MCNC: 47 MG/DL — HIGH (ref 7–23)
CALCIUM SERPL-MCNC: 8.7 MG/DL — SIGNIFICANT CHANGE UP (ref 8.4–10.5)
CALCIUM SERPL-MCNC: 8.7 MG/DL — SIGNIFICANT CHANGE UP (ref 8.4–10.5)
CHLORIDE SERPL-SCNC: 108 MMOL/L — SIGNIFICANT CHANGE UP (ref 96–108)
CHLORIDE SERPL-SCNC: 108 MMOL/L — SIGNIFICANT CHANGE UP (ref 96–108)
CO2 SERPL-SCNC: 18 MMOL/L — LOW (ref 22–31)
CO2 SERPL-SCNC: 18 MMOL/L — LOW (ref 22–31)
CREAT SERPL-MCNC: 1.53 MG/DL — HIGH (ref 0.5–1.3)
CREAT SERPL-MCNC: 1.53 MG/DL — HIGH (ref 0.5–1.3)
EGFR: 31 ML/MIN/1.73M2 — LOW
EGFR: 31 ML/MIN/1.73M2 — LOW
FERRITIN SERPL-MCNC: 25 NG/ML — SIGNIFICANT CHANGE UP (ref 13–330)
FERRITIN SERPL-MCNC: 25 NG/ML — SIGNIFICANT CHANGE UP (ref 13–330)
FOLATE SERPL-MCNC: 11.1 NG/ML — SIGNIFICANT CHANGE UP
FOLATE SERPL-MCNC: 11.1 NG/ML — SIGNIFICANT CHANGE UP
GLUCOSE SERPL-MCNC: 96 MG/DL — SIGNIFICANT CHANGE UP (ref 70–99)
GLUCOSE SERPL-MCNC: 96 MG/DL — SIGNIFICANT CHANGE UP (ref 70–99)
HAPTOGLOB SERPL-MCNC: 118 MG/DL — SIGNIFICANT CHANGE UP (ref 34–200)
HAPTOGLOB SERPL-MCNC: 118 MG/DL — SIGNIFICANT CHANGE UP (ref 34–200)
IRON SATN MFR SERPL: 13 % — LOW (ref 14–50)
IRON SATN MFR SERPL: 13 % — LOW (ref 14–50)
IRON SATN MFR SERPL: 43 UG/DL — SIGNIFICANT CHANGE UP (ref 30–160)
IRON SATN MFR SERPL: 43 UG/DL — SIGNIFICANT CHANGE UP (ref 30–160)
LDH SERPL L TO P-CCNC: 490 U/L — HIGH (ref 50–242)
LDH SERPL L TO P-CCNC: 490 U/L — HIGH (ref 50–242)
POTASSIUM SERPL-MCNC: 5.3 MMOL/L — SIGNIFICANT CHANGE UP (ref 3.5–5.3)
POTASSIUM SERPL-MCNC: 5.3 MMOL/L — SIGNIFICANT CHANGE UP (ref 3.5–5.3)
POTASSIUM SERPL-SCNC: 5.3 MMOL/L — SIGNIFICANT CHANGE UP (ref 3.5–5.3)
POTASSIUM SERPL-SCNC: 5.3 MMOL/L — SIGNIFICANT CHANGE UP (ref 3.5–5.3)
PROT SERPL-MCNC: 6.7 G/DL — SIGNIFICANT CHANGE UP (ref 6–8.3)
PROT SERPL-MCNC: 6.7 G/DL — SIGNIFICANT CHANGE UP (ref 6–8.3)
SODIUM SERPL-SCNC: 141 MMOL/L — SIGNIFICANT CHANGE UP (ref 135–145)
SODIUM SERPL-SCNC: 141 MMOL/L — SIGNIFICANT CHANGE UP (ref 135–145)
TIBC SERPL-MCNC: 326 UG/DL — SIGNIFICANT CHANGE UP (ref 220–430)
TIBC SERPL-MCNC: 326 UG/DL — SIGNIFICANT CHANGE UP (ref 220–430)
TRANSFERRIN SERPL-MCNC: 235 MG/DL — SIGNIFICANT CHANGE UP (ref 200–360)
TRANSFERRIN SERPL-MCNC: 235 MG/DL — SIGNIFICANT CHANGE UP (ref 200–360)
UIBC SERPL-MCNC: 282 UG/DL — SIGNIFICANT CHANGE UP (ref 110–370)
UIBC SERPL-MCNC: 282 UG/DL — SIGNIFICANT CHANGE UP (ref 110–370)
VIT B12 SERPL-MCNC: 280 PG/ML — SIGNIFICANT CHANGE UP (ref 232–1245)
VIT B12 SERPL-MCNC: 280 PG/ML — SIGNIFICANT CHANGE UP (ref 232–1245)

## 2023-11-20 PROCEDURE — 82607 VITAMIN B-12: CPT

## 2023-11-20 PROCEDURE — 82728 ASSAY OF FERRITIN: CPT

## 2023-11-20 PROCEDURE — 83615 LACTATE (LD) (LDH) ENZYME: CPT

## 2023-11-20 PROCEDURE — 83540 ASSAY OF IRON: CPT

## 2023-11-20 PROCEDURE — 83550 IRON BINDING TEST: CPT

## 2023-11-20 PROCEDURE — 83010 ASSAY OF HAPTOGLOBIN QUANT: CPT

## 2023-11-20 PROCEDURE — 99204 OFFICE O/P NEW MOD 45 MIN: CPT

## 2023-11-20 PROCEDURE — 82784 ASSAY IGA/IGD/IGG/IGM EACH: CPT

## 2023-11-20 PROCEDURE — 80053 COMPREHEN METABOLIC PANEL: CPT

## 2023-11-20 PROCEDURE — 84466 ASSAY OF TRANSFERRIN: CPT

## 2023-11-20 PROCEDURE — 82746 ASSAY OF FOLIC ACID SERUM: CPT

## 2023-11-20 RX ORDER — TORSEMIDE 5 MG/1
TABLET ORAL
Refills: 0 | Status: ACTIVE | COMMUNITY

## 2023-11-20 RX ORDER — SULFAMETHOXAZOLE AND TRIMETHOPRIM 800; 160 MG/1; MG/1
800-160 TABLET ORAL TWICE DAILY
Qty: 6 | Refills: 0 | Status: DISCONTINUED | COMMUNITY
Start: 2020-08-03 | End: 2023-11-20

## 2023-11-21 LAB
IGA FLD-MCNC: 262 MG/DL — SIGNIFICANT CHANGE UP (ref 84–499)
IGA FLD-MCNC: 262 MG/DL — SIGNIFICANT CHANGE UP (ref 84–499)
IGG FLD-MCNC: 1010 MG/DL — SIGNIFICANT CHANGE UP (ref 610–1660)
IGG FLD-MCNC: 1010 MG/DL — SIGNIFICANT CHANGE UP (ref 610–1660)
IGM SERPL-MCNC: 104 MG/DL — SIGNIFICANT CHANGE UP (ref 35–242)
IGM SERPL-MCNC: 104 MG/DL — SIGNIFICANT CHANGE UP (ref 35–242)
KAPPA LC SER QL IFE: 12.91 MG/DL — HIGH (ref 0.33–1.94)
KAPPA LC SER QL IFE: 12.91 MG/DL — HIGH (ref 0.33–1.94)
KAPPA/LAMBDA FREE LIGHT CHAIN RATIO, SERUM: 2.56 RATIO — HIGH (ref 0.26–1.65)
KAPPA/LAMBDA FREE LIGHT CHAIN RATIO, SERUM: 2.56 RATIO — HIGH (ref 0.26–1.65)
LAMBDA LC SER QL IFE: 5.05 MG/DL — HIGH (ref 0.57–2.63)
LAMBDA LC SER QL IFE: 5.05 MG/DL — HIGH (ref 0.57–2.63)

## 2023-11-24 ENCOUNTER — NON-APPOINTMENT (OUTPATIENT)
Age: 88
End: 2023-11-24

## 2023-11-28 ENCOUNTER — APPOINTMENT (OUTPATIENT)
Age: 88
End: 2023-11-28

## 2023-12-05 ENCOUNTER — APPOINTMENT (OUTPATIENT)
Age: 88
End: 2023-12-05

## 2023-12-12 ENCOUNTER — APPOINTMENT (OUTPATIENT)
Age: 88
End: 2023-12-12

## 2023-12-19 ENCOUNTER — APPOINTMENT (OUTPATIENT)
Age: 88
End: 2023-12-19

## 2023-12-26 ENCOUNTER — APPOINTMENT (OUTPATIENT)
Age: 88
End: 2023-12-26

## 2024-01-02 ENCOUNTER — APPOINTMENT (OUTPATIENT)
Age: 89
End: 2024-01-02

## 2024-01-09 ENCOUNTER — APPOINTMENT (OUTPATIENT)
Age: 89
End: 2024-01-09

## 2024-01-16 ENCOUNTER — APPOINTMENT (OUTPATIENT)
Age: 89
End: 2024-01-16

## 2024-01-23 ENCOUNTER — APPOINTMENT (OUTPATIENT)
Age: 89
End: 2024-01-23

## 2024-02-06 ENCOUNTER — NON-APPOINTMENT (OUTPATIENT)
Age: 89
End: 2024-02-06

## 2024-02-07 ENCOUNTER — NON-APPOINTMENT (OUTPATIENT)
Age: 89
End: 2024-02-07

## 2024-02-27 ENCOUNTER — NON-APPOINTMENT (OUTPATIENT)
Age: 89
End: 2024-02-27

## 2024-03-05 ENCOUNTER — TRANSCRIPTION ENCOUNTER (OUTPATIENT)
Age: 89
End: 2024-03-05

## 2024-03-06 ENCOUNTER — TRANSCRIPTION ENCOUNTER (OUTPATIENT)
Age: 89
End: 2024-03-06

## 2024-03-18 ENCOUNTER — NON-APPOINTMENT (OUTPATIENT)
Age: 89
End: 2024-03-18

## 2024-09-07 ENCOUNTER — TRANSCRIPTION ENCOUNTER (OUTPATIENT)
Age: 89
End: 2024-09-07

## 2024-09-10 ENCOUNTER — TRANSCRIPTION ENCOUNTER (OUTPATIENT)
Age: 89
End: 2024-09-10

## 2024-09-10 ENCOUNTER — APPOINTMENT (OUTPATIENT)
Dept: CARE COORDINATION | Facility: HOME HEALTH | Age: 89
End: 2024-09-10
Payer: MEDICARE

## 2024-09-10 DIAGNOSIS — R60.0 LOCALIZED EDEMA: ICD-10-CM

## 2024-09-10 DIAGNOSIS — N18.30 CHRONIC KIDNEY DISEASE, STAGE 3 UNSPECIFIED: ICD-10-CM

## 2024-09-10 DIAGNOSIS — J69.0 PNEUMONITIS DUE TO INHALATION OF FOOD AND VOMIT: ICD-10-CM

## 2024-09-10 PROCEDURE — 99348 HOME/RES VST EST LOW MDM 30: CPT

## 2024-09-11 VITALS
SYSTOLIC BLOOD PRESSURE: 120 MMHG | OXYGEN SATURATION: 98 % | DIASTOLIC BLOOD PRESSURE: 60 MMHG | RESPIRATION RATE: 15 BRPM | HEART RATE: 91 BPM

## 2024-09-11 PROBLEM — N18.30 CKD (CHRONIC KIDNEY DISEASE), STAGE III: Status: ACTIVE | Noted: 2024-09-11

## 2024-09-11 PROBLEM — R60.0 BILATERAL EDEMA OF LOWER EXTREMITY: Status: ACTIVE | Noted: 2024-09-11

## 2024-09-11 PROBLEM — J69.0 ASPIRATION PNEUMONIA, UNSPECIFIED ASPIRATION PNEUMONIA TYPE, UNSPECIFIED LATERALITY, UNSPECIFIED PART OF LUNG: Status: ACTIVE | Noted: 2024-09-11

## 2024-09-11 RX ORDER — AMLODIPINE BESYLATE 5 MG/1
5 TABLET ORAL
Qty: 30 | Refills: 0 | Status: ACTIVE | COMMUNITY

## 2024-09-11 RX ORDER — POTASSIUM CHLORIDE 750 MG/1
10 CAPSULE, EXTENDED RELEASE ORAL
Qty: 30 | Refills: 0 | Status: ACTIVE | COMMUNITY

## 2024-09-11 RX ORDER — HYDRALAZINE HYDROCHLORIDE 25 MG/1
25 TABLET ORAL TWICE DAILY
Refills: 0 | Status: ACTIVE | COMMUNITY

## 2024-09-11 NOTE — HISTORY OF PRESENT ILLNESS
[Post-hospitalization from ___ Hospital] : Post-hospitalization from [unfilled] Hospital [Admitted on: ___] : The patient was admitted on [unfilled] [Discharged on ___] : discharged on [unfilled] [FreeTextEntry2] : Copied from EMR, " Patient is a 89yF with pmh htn, chronic LE edema, DVT   (not on AC), chronic anemia, heart failure, L lazy eye, macular degeneration, ckg stage 3b presents weakness and tremors for the past day. Patient admitted for sepsis, pneumonia vs acute uti. .1) Sepsis, Acute Urinary Tract infection, sepsis 2/2 UTI, Bacterial Pneumonia,    DetailsCT A  /P  8/27: Possible mucous plugging versus aspirated material within right lower lobe bronchi with suggestion of small volume patchy right lower lobe airspace disease. Severe long segment wall thickening of rectum to distal sigmoid colon with surrounding inflammation most in keeping with acute proctocolitis. Trace right and small left mildly loculated pleural effusions. Bladder wall thickening with perivesicular inflammation compatible with cystitis, correlate with urinalysis UA   8/27 neg nitrates, mod wbc esterase, many bacteriaUrine culture from 3/2024 and 2/2024 grew Proteus mirabilis, sensitive to Zosynresistant to MacrobidSIRS criteria met: temp 100.9,  , WBC 20.74 Source: pneumonia and/or uti S/p 100mL IV acetaminophen, cefepime, 1L ns in the ED urine culture revealed preliminary Proteus Mirabilis, pending sensitivity,   Planfinished a course of IV zosyn F/u urine culture - Proteus mirabilis , sensitive to Zosyn blood culture negativePT OT evalSwallow evalReplete electrolytes as needed K>4, Ph>3, Mg>2Code: Full CodeDiet: Nectar thick and bite sized foodDVT ppx: Heparin.2) Hypertension, CKD stage 3B, Hx of C diff, Chronic Diastolic Heart Failure,    DetailsChronic condition BUN/Cr 47/1.4 eGFR 34BP fluctuates,   PlanContinue home medications (aspirin, latanoprost)Bacid continue Amlodipine 10 mg PO dailycontinue Hydralazine - 100 mg PO BIDcardiology consult from [facility] Joseph and Dr. Chavarria - a note reviewed, f/u recs.3) Incidental findings,    DetailsCT A  /P: Large hiatal hernia containing nearly entire stomach and portion of transverse colon along with a moderate to large right inguinal hernia containing small bowel. No bowel obstructionunchanged from previous,   Plan pain control.4) severe malnutrition,    Plan continue Ensure Enlive .Code Status Full Code.Comfort Measure Only No.Advance Directive Yes .Thromboprophylaxis Plan Thromboprophylaxis Yes  Medication LMWH; VTE Devices Applied Yes .Care Plan D/W Patient, Family, Nurse, , , Unit Manager"  Pt seen in her home for transitional care management w/daughter Cecilia present. She reports feeling well, had BM today, using rollator to ambulate.. Denies cough, fever, chills. She returned to ED x2 once for weakness, then for a fall in which pt had no injuries. She completed oral antibiotic course. Bilat LE edema present. Daughter reports pt sees Dr. Stephens for cardiology and called him after d/c. He decreased amlodipine to 5mg, hydralazine to 35mg BI and cont Torsemide 10mg QOD. No home BP monitoring, daughter reports pt had been dizzy, pt denies current dizziness or lightheadedness.

## 2024-09-11 NOTE — PLAN
[FreeTextEntry1] : At home bilat LE doppler ordered [facility] referral sent [facility] contacted for home care for home PT

## 2024-09-11 NOTE — PHYSICAL EXAM
[No Acute Distress] : no acute distress [Well Nourished] : well nourished [Well Developed] : well developed [Well-Appearing] : well-appearing [No Respiratory Distress] : no respiratory distress  [Clear to Auscultation] : lungs were clear to auscultation bilaterally [No Accessory Muscle Use] : no accessory muscle use [Normal Rate] : normal rate  [Regular Rhythm] : with a regular rhythm [Normal S1, S2] : normal S1 and S2 [Pedal Pulses Present] : the pedal pulses are present [No Focal Deficits] : no focal deficits [Speech Grossly Normal] : speech grossly normal [Normal Affect] : the affect was normal [Alert and Oriented x3] : oriented to person, place, and time [Normal Mood] : the mood was normal [de-identified] : 2/6 systolic murmur  [de-identified] : Bilat LE L>R

## 2024-09-11 NOTE — REVIEW OF SYSTEMS
[Lower Ext Edema] : lower extremity edema [Muscle Weakness] : muscle weakness [Negative] : Psychiatric [Chest Pain] : no chest pain [Palpitations] : no palpitations [Leg Claudication] : no leg claudication [Orthopnea] : no orthopnea [Paroxysmal Nocturnal Dyspnea] : no paroxysmal nocturnal dyspnea

## 2024-09-16 ENCOUNTER — TRANSCRIPTION ENCOUNTER (OUTPATIENT)
Age: 89
End: 2024-09-16

## 2024-09-17 ENCOUNTER — TRANSCRIPTION ENCOUNTER (OUTPATIENT)
Age: 89
End: 2024-09-17

## 2024-09-24 ENCOUNTER — TRANSCRIPTION ENCOUNTER (OUTPATIENT)
Age: 89
End: 2024-09-24

## 2024-09-30 ENCOUNTER — TRANSCRIPTION ENCOUNTER (OUTPATIENT)
Age: 89
End: 2024-09-30

## 2024-10-03 ENCOUNTER — TRANSCRIPTION ENCOUNTER (OUTPATIENT)
Age: 89
End: 2024-10-03

## 2025-02-24 ENCOUNTER — RESULT REVIEW (OUTPATIENT)
Age: 89
End: 2025-02-24

## 2025-03-19 DIAGNOSIS — L08.9 LOCAL INFECTION OF THE SKIN AND SUBCUTANEOUS TISSUE, UNSPECIFIED: ICD-10-CM

## 2025-03-26 ENCOUNTER — APPOINTMENT (OUTPATIENT)
Dept: INFECTIOUS DISEASE | Facility: CLINIC | Age: 89
End: 2025-03-26

## 2025-03-27 ENCOUNTER — NON-APPOINTMENT (OUTPATIENT)
Age: 89
End: 2025-03-27

## 2025-03-27 DIAGNOSIS — R06.02 SHORTNESS OF BREATH: ICD-10-CM
